# Patient Record
Sex: FEMALE | Race: ASIAN | NOT HISPANIC OR LATINO | Employment: OTHER | URBAN - METROPOLITAN AREA
[De-identification: names, ages, dates, MRNs, and addresses within clinical notes are randomized per-mention and may not be internally consistent; named-entity substitution may affect disease eponyms.]

---

## 2023-02-08 ENCOUNTER — APPOINTMENT (OUTPATIENT)
Dept: RADIOLOGY | Facility: MEDICAL CENTER | Age: 78
DRG: 065 | End: 2023-02-08
Attending: STUDENT IN AN ORGANIZED HEALTH CARE EDUCATION/TRAINING PROGRAM
Payer: MEDICARE

## 2023-02-08 ENCOUNTER — HOSPITAL ENCOUNTER (OUTPATIENT)
Dept: RADIOLOGY | Facility: MEDICAL CENTER | Age: 78
End: 2023-02-08

## 2023-02-08 ENCOUNTER — HOSPITAL ENCOUNTER (INPATIENT)
Facility: MEDICAL CENTER | Age: 78
LOS: 1 days | DRG: 065 | End: 2023-02-09
Attending: STUDENT IN AN ORGANIZED HEALTH CARE EDUCATION/TRAINING PROGRAM | Admitting: STUDENT IN AN ORGANIZED HEALTH CARE EDUCATION/TRAINING PROGRAM
Payer: MEDICARE

## 2023-02-08 DIAGNOSIS — R53.1 LEFT-SIDED WEAKNESS: ICD-10-CM

## 2023-02-08 DIAGNOSIS — I63.9 CEREBROVASCULAR ACCIDENT (CVA), UNSPECIFIED MECHANISM (HCC): ICD-10-CM

## 2023-02-08 LAB
ALBUMIN SERPL BCP-MCNC: 4.6 G/DL (ref 3.2–4.9)
ALBUMIN/GLOB SERPL: 1.5 G/DL
ALP SERPL-CCNC: 98 U/L (ref 30–99)
ALT SERPL-CCNC: 10 U/L (ref 2–50)
ANION GAP SERPL CALC-SCNC: 12 MMOL/L (ref 7–16)
AST SERPL-CCNC: 20 U/L (ref 12–45)
BASOPHILS # BLD AUTO: 0.4 % (ref 0–1.8)
BASOPHILS # BLD: 0.02 K/UL (ref 0–0.12)
BILIRUB SERPL-MCNC: 0.3 MG/DL (ref 0.1–1.5)
BUN SERPL-MCNC: 22 MG/DL (ref 8–22)
CALCIUM ALBUM COR SERPL-MCNC: 8.9 MG/DL (ref 8.5–10.5)
CALCIUM SERPL-MCNC: 9.4 MG/DL (ref 8.5–10.5)
CHLORIDE SERPL-SCNC: 106 MMOL/L (ref 96–112)
CO2 SERPL-SCNC: 22 MMOL/L (ref 20–33)
CREAT SERPL-MCNC: 1.12 MG/DL (ref 0.5–1.4)
EOSINOPHIL # BLD AUTO: 0.03 K/UL (ref 0–0.51)
EOSINOPHIL NFR BLD: 0.5 % (ref 0–6.9)
ERYTHROCYTE [DISTWIDTH] IN BLOOD BY AUTOMATED COUNT: 53.6 FL (ref 35.9–50)
GFR SERPLBLD CREATININE-BSD FMLA CKD-EPI: 51 ML/MIN/1.73 M 2
GLOBULIN SER CALC-MCNC: 3 G/DL (ref 1.9–3.5)
GLUCOSE SERPL-MCNC: 154 MG/DL (ref 65–99)
HCT VFR BLD AUTO: 33.3 % (ref 37–47)
HGB BLD-MCNC: 11.3 G/DL (ref 12–16)
IMM GRANULOCYTES # BLD AUTO: 0.01 K/UL (ref 0–0.11)
IMM GRANULOCYTES NFR BLD AUTO: 0.2 % (ref 0–0.9)
INR PPP: 0.99 (ref 0.87–1.13)
LYMPHOCYTES # BLD AUTO: 2.41 K/UL (ref 1–4.8)
LYMPHOCYTES NFR BLD: 43.9 % (ref 22–41)
MCH RBC QN AUTO: 33.9 PG (ref 27–33)
MCHC RBC AUTO-ENTMCNC: 33.9 G/DL (ref 33.6–35)
MCV RBC AUTO: 100 FL (ref 81.4–97.8)
MONOCYTES # BLD AUTO: 0.4 K/UL (ref 0–0.85)
MONOCYTES NFR BLD AUTO: 7.3 % (ref 0–13.4)
NEUTROPHILS # BLD AUTO: 2.62 K/UL (ref 2–7.15)
NEUTROPHILS NFR BLD: 47.7 % (ref 44–72)
NRBC # BLD AUTO: 0 K/UL
NRBC BLD-RTO: 0 /100 WBC
PLATELET # BLD AUTO: 196 K/UL (ref 164–446)
PMV BLD AUTO: 9.2 FL (ref 9–12.9)
POTASSIUM SERPL-SCNC: 4.1 MMOL/L (ref 3.6–5.5)
PROT SERPL-MCNC: 7.6 G/DL (ref 6–8.2)
PROTHROMBIN TIME: 13 SEC (ref 12–14.6)
RBC # BLD AUTO: 3.33 M/UL (ref 4.2–5.4)
SODIUM SERPL-SCNC: 140 MMOL/L (ref 135–145)
T4 FREE SERPL-MCNC: 0.97 NG/DL (ref 0.93–1.7)
TROPONIN T SERPL-MCNC: 11 NG/L (ref 6–19)
TSH SERPL DL<=0.005 MIU/L-ACNC: 6.28 UIU/ML (ref 0.38–5.33)
WBC # BLD AUTO: 5.5 K/UL (ref 4.8–10.8)

## 2023-02-08 PROCEDURE — 99222 1ST HOSP IP/OBS MODERATE 55: CPT | Mod: AI | Performed by: STUDENT IN AN ORGANIZED HEALTH CARE EDUCATION/TRAINING PROGRAM

## 2023-02-08 PROCEDURE — 84443 ASSAY THYROID STIM HORMONE: CPT

## 2023-02-08 PROCEDURE — 770020 HCHG ROOM/CARE - TELE (206)

## 2023-02-08 PROCEDURE — 36415 COLL VENOUS BLD VENIPUNCTURE: CPT

## 2023-02-08 PROCEDURE — 85025 COMPLETE CBC W/AUTO DIFF WBC: CPT

## 2023-02-08 PROCEDURE — 85610 PROTHROMBIN TIME: CPT

## 2023-02-08 PROCEDURE — 84484 ASSAY OF TROPONIN QUANT: CPT

## 2023-02-08 PROCEDURE — 99285 EMERGENCY DEPT VISIT HI MDM: CPT

## 2023-02-08 PROCEDURE — 80053 COMPREHEN METABOLIC PANEL: CPT

## 2023-02-08 PROCEDURE — 84439 ASSAY OF FREE THYROXINE: CPT

## 2023-02-08 RX ORDER — POLYETHYLENE GLYCOL 3350 17 G/17G
1 POWDER, FOR SOLUTION ORAL
Status: DISCONTINUED | OUTPATIENT
Start: 2023-02-08 | End: 2023-02-09

## 2023-02-08 RX ORDER — METOPROLOL SUCCINATE 50 MG/1
50 TABLET, EXTENDED RELEASE ORAL DAILY
Status: DISCONTINUED | OUTPATIENT
Start: 2023-02-09 | End: 2023-02-09 | Stop reason: HOSPADM

## 2023-02-08 RX ORDER — ASPIRIN 325 MG
325 TABLET ORAL ONCE
Status: DISCONTINUED | OUTPATIENT
Start: 2023-02-09 | End: 2023-02-09

## 2023-02-08 RX ORDER — LISINOPRIL 20 MG/1
20 TABLET ORAL DAILY
Status: DISCONTINUED | OUTPATIENT
Start: 2023-02-09 | End: 2023-02-09 | Stop reason: HOSPADM

## 2023-02-08 RX ORDER — BISACODYL 10 MG
10 SUPPOSITORY, RECTAL RECTAL
Status: DISCONTINUED | OUTPATIENT
Start: 2023-02-08 | End: 2023-02-09

## 2023-02-08 RX ORDER — METHIMAZOLE 5 MG/1
5 TABLET ORAL 2 TIMES DAILY
COMMUNITY

## 2023-02-08 RX ORDER — LABETALOL HYDROCHLORIDE 5 MG/ML
10 INJECTION, SOLUTION INTRAVENOUS EVERY 4 HOURS PRN
Status: DISCONTINUED | OUTPATIENT
Start: 2023-02-08 | End: 2023-02-09 | Stop reason: HOSPADM

## 2023-02-08 RX ORDER — METHIMAZOLE 5 MG/1
5 TABLET ORAL DAILY
Status: DISCONTINUED | OUTPATIENT
Start: 2023-02-09 | End: 2023-02-09 | Stop reason: HOSPADM

## 2023-02-08 RX ORDER — ATORVASTATIN CALCIUM 40 MG/1
40 TABLET, FILM COATED ORAL EVERY EVENING
Status: DISCONTINUED | OUTPATIENT
Start: 2023-02-09 | End: 2023-02-08

## 2023-02-08 RX ORDER — ONDANSETRON 4 MG/1
4 TABLET, ORALLY DISINTEGRATING ORAL EVERY 4 HOURS PRN
Status: DISCONTINUED | OUTPATIENT
Start: 2023-02-08 | End: 2023-02-09 | Stop reason: HOSPADM

## 2023-02-08 RX ORDER — ATORVASTATIN CALCIUM 40 MG/1
40 TABLET, FILM COATED ORAL EVERY EVENING
Status: DISCONTINUED | OUTPATIENT
Start: 2023-02-09 | End: 2023-02-09 | Stop reason: HOSPADM

## 2023-02-08 RX ORDER — LISINOPRIL 20 MG/1
20 TABLET ORAL DAILY
COMMUNITY

## 2023-02-08 RX ORDER — ACETAMINOPHEN 325 MG/1
650 TABLET ORAL EVERY 6 HOURS PRN
Status: DISCONTINUED | OUTPATIENT
Start: 2023-02-08 | End: 2023-02-09 | Stop reason: HOSPADM

## 2023-02-08 RX ORDER — ONDANSETRON 2 MG/ML
4 INJECTION INTRAMUSCULAR; INTRAVENOUS EVERY 4 HOURS PRN
Status: DISCONTINUED | OUTPATIENT
Start: 2023-02-08 | End: 2023-02-09 | Stop reason: HOSPADM

## 2023-02-08 RX ORDER — AMOXICILLIN 250 MG
2 CAPSULE ORAL 2 TIMES DAILY
Status: DISCONTINUED | OUTPATIENT
Start: 2023-02-09 | End: 2023-02-09

## 2023-02-08 RX ORDER — METOPROLOL SUCCINATE 50 MG/1
50 TABLET, EXTENDED RELEASE ORAL DAILY
COMMUNITY

## 2023-02-09 ENCOUNTER — PHARMACY VISIT (OUTPATIENT)
Dept: PHARMACY | Facility: MEDICAL CENTER | Age: 78
End: 2023-02-09
Payer: MEDICARE

## 2023-02-09 ENCOUNTER — APPOINTMENT (OUTPATIENT)
Dept: CARDIOLOGY | Facility: MEDICAL CENTER | Age: 78
DRG: 065 | End: 2023-02-09
Attending: GENERAL PRACTICE
Payer: MEDICARE

## 2023-02-09 ENCOUNTER — NON-PROVIDER VISIT (OUTPATIENT)
Dept: CARDIOLOGY | Facility: MEDICAL CENTER | Age: 78
End: 2023-02-09

## 2023-02-09 VITALS
OXYGEN SATURATION: 96 % | BODY MASS INDEX: 21.52 KG/M2 | SYSTOLIC BLOOD PRESSURE: 152 MMHG | RESPIRATION RATE: 16 BRPM | WEIGHT: 129.19 LBS | HEART RATE: 75 BPM | HEIGHT: 65 IN | DIASTOLIC BLOOD PRESSURE: 84 MMHG | TEMPERATURE: 97.9 F

## 2023-02-09 DIAGNOSIS — I63.9 CEREBROVASCULAR ACCIDENT (CVA), UNSPECIFIED MECHANISM (HCC): ICD-10-CM

## 2023-02-09 DIAGNOSIS — I49.3 PVC'S (PREMATURE VENTRICULAR CONTRACTIONS): ICD-10-CM

## 2023-02-09 DIAGNOSIS — I49.1 APC (ATRIAL PREMATURE CONTRACTIONS): ICD-10-CM

## 2023-02-09 DIAGNOSIS — I47.10 SVT (SUPRAVENTRICULAR TACHYCARDIA) (HCC): ICD-10-CM

## 2023-02-09 PROBLEM — E03.9 HYPOTHYROIDISM: Status: ACTIVE | Noted: 2023-02-09

## 2023-02-09 PROBLEM — I10 HYPERTENSION: Status: ACTIVE | Noted: 2023-02-09

## 2023-02-09 PROBLEM — R73.9 HYPERGLYCEMIA: Status: ACTIVE | Noted: 2023-02-09

## 2023-02-09 LAB
ALBUMIN SERPL BCP-MCNC: 4.6 G/DL (ref 3.2–4.9)
BASOPHILS # BLD AUTO: 0.3 % (ref 0–1.8)
BASOPHILS # BLD: 0.02 K/UL (ref 0–0.12)
BUN SERPL-MCNC: 19 MG/DL (ref 8–22)
CALCIUM ALBUM COR SERPL-MCNC: 8.9 MG/DL (ref 8.5–10.5)
CALCIUM SERPL-MCNC: 9.4 MG/DL (ref 8.5–10.5)
CHLORIDE SERPL-SCNC: 106 MMOL/L (ref 96–112)
CHOLEST SERPL-MCNC: 151 MG/DL (ref 100–199)
CO2 SERPL-SCNC: 21 MMOL/L (ref 20–33)
CREAT SERPL-MCNC: 0.97 MG/DL (ref 0.5–1.4)
EKG IMPRESSION: NORMAL
EOSINOPHIL # BLD AUTO: 0.02 K/UL (ref 0–0.51)
EOSINOPHIL NFR BLD: 0.3 % (ref 0–6.9)
ERYTHROCYTE [DISTWIDTH] IN BLOOD BY AUTOMATED COUNT: 52.9 FL (ref 35.9–50)
EST. AVERAGE GLUCOSE BLD GHB EST-MCNC: 123 MG/DL
GFR SERPLBLD CREATININE-BSD FMLA CKD-EPI: 61 ML/MIN/1.73 M 2
GLUCOSE SERPL-MCNC: 114 MG/DL (ref 65–99)
HBA1C MFR BLD: 5.9 % (ref 4–5.6)
HCT VFR BLD AUTO: 34.4 % (ref 37–47)
HDLC SERPL-MCNC: 40 MG/DL
HGB BLD-MCNC: 11.8 G/DL (ref 12–16)
IMM GRANULOCYTES # BLD AUTO: 0.01 K/UL (ref 0–0.11)
IMM GRANULOCYTES NFR BLD AUTO: 0.2 % (ref 0–0.9)
LDLC SERPL CALC-MCNC: 95 MG/DL
LV EJECT FRACT  99904: 65
LV EJECT FRACT MOD 2C 99903: 70.52
LV EJECT FRACT MOD 4C 99902: 63.3
LV EJECT FRACT MOD BP 99901: 69.76
LYMPHOCYTES # BLD AUTO: 2.84 K/UL (ref 1–4.8)
LYMPHOCYTES NFR BLD: 44.2 % (ref 22–41)
MAGNESIUM SERPL-MCNC: 2.3 MG/DL (ref 1.5–2.5)
MCH RBC QN AUTO: 34.2 PG (ref 27–33)
MCHC RBC AUTO-ENTMCNC: 34.3 G/DL (ref 33.6–35)
MCV RBC AUTO: 99.7 FL (ref 81.4–97.8)
MONOCYTES # BLD AUTO: 0.35 K/UL (ref 0–0.85)
MONOCYTES NFR BLD AUTO: 5.4 % (ref 0–13.4)
NEUTROPHILS # BLD AUTO: 3.19 K/UL (ref 2–7.15)
NEUTROPHILS NFR BLD: 49.6 % (ref 44–72)
NRBC # BLD AUTO: 0 K/UL
NRBC BLD-RTO: 0 /100 WBC
PHOSPHATE SERPL-MCNC: 3.5 MG/DL (ref 2.5–4.5)
PLATELET # BLD AUTO: 182 K/UL (ref 164–446)
PMV BLD AUTO: 9.3 FL (ref 9–12.9)
POTASSIUM SERPL-SCNC: 4.8 MMOL/L (ref 3.6–5.5)
RBC # BLD AUTO: 3.45 M/UL (ref 4.2–5.4)
SODIUM SERPL-SCNC: 139 MMOL/L (ref 135–145)
T4 FREE SERPL-MCNC: 1.01 NG/DL (ref 0.93–1.7)
TRIGL SERPL-MCNC: 80 MG/DL (ref 0–149)
TSH SERPL DL<=0.005 MIU/L-ACNC: 6.22 UIU/ML (ref 0.38–5.33)
WBC # BLD AUTO: 6.4 K/UL (ref 4.8–10.8)

## 2023-02-09 PROCEDURE — 97165 OT EVAL LOW COMPLEX 30 MIN: CPT

## 2023-02-09 PROCEDURE — A9270 NON-COVERED ITEM OR SERVICE: HCPCS | Performed by: GENERAL PRACTICE

## 2023-02-09 PROCEDURE — 85025 COMPLETE CBC W/AUTO DIFF WBC: CPT

## 2023-02-09 PROCEDURE — 700117 HCHG RX CONTRAST REV CODE 255: Performed by: STUDENT IN AN ORGANIZED HEALTH CARE EDUCATION/TRAINING PROGRAM

## 2023-02-09 PROCEDURE — 97535 SELF CARE MNGMENT TRAINING: CPT

## 2023-02-09 PROCEDURE — 700102 HCHG RX REV CODE 250 W/ 637 OVERRIDE(OP): Performed by: GENERAL PRACTICE

## 2023-02-09 PROCEDURE — A9270 NON-COVERED ITEM OR SERVICE: HCPCS

## 2023-02-09 PROCEDURE — 83735 ASSAY OF MAGNESIUM: CPT

## 2023-02-09 PROCEDURE — 92610 EVALUATE SWALLOWING FUNCTION: CPT

## 2023-02-09 PROCEDURE — 93005 ELECTROCARDIOGRAM TRACING: CPT | Performed by: STUDENT IN AN ORGANIZED HEALTH CARE EDUCATION/TRAINING PROGRAM

## 2023-02-09 PROCEDURE — 83036 HEMOGLOBIN GLYCOSYLATED A1C: CPT

## 2023-02-09 PROCEDURE — 80061 LIPID PANEL: CPT

## 2023-02-09 PROCEDURE — 93306 TTE W/DOPPLER COMPLETE: CPT

## 2023-02-09 PROCEDURE — 70498 CT ANGIOGRAPHY NECK: CPT

## 2023-02-09 PROCEDURE — A9270 NON-COVERED ITEM OR SERVICE: HCPCS | Performed by: STUDENT IN AN ORGANIZED HEALTH CARE EDUCATION/TRAINING PROGRAM

## 2023-02-09 PROCEDURE — 84439 ASSAY OF FREE THYROXINE: CPT

## 2023-02-09 PROCEDURE — 99223 1ST HOSP IP/OBS HIGH 75: CPT | Mod: FS | Performed by: NURSE PRACTITIONER

## 2023-02-09 PROCEDURE — 700102 HCHG RX REV CODE 250 W/ 637 OVERRIDE(OP): Performed by: STUDENT IN AN ORGANIZED HEALTH CARE EDUCATION/TRAINING PROGRAM

## 2023-02-09 PROCEDURE — 700102 HCHG RX REV CODE 250 W/ 637 OVERRIDE(OP)

## 2023-02-09 PROCEDURE — 70496 CT ANGIOGRAPHY HEAD: CPT

## 2023-02-09 PROCEDURE — 80069 RENAL FUNCTION PANEL: CPT

## 2023-02-09 PROCEDURE — 97162 PT EVAL MOD COMPLEX 30 MIN: CPT

## 2023-02-09 PROCEDURE — 84443 ASSAY THYROID STIM HORMONE: CPT

## 2023-02-09 PROCEDURE — RXMED WILLOW AMBULATORY MEDICATION CHARGE: Performed by: GENERAL PRACTICE

## 2023-02-09 PROCEDURE — 93010 ELECTROCARDIOGRAM REPORT: CPT | Performed by: INTERNAL MEDICINE

## 2023-02-09 PROCEDURE — 36415 COLL VENOUS BLD VENIPUNCTURE: CPT

## 2023-02-09 PROCEDURE — 99239 HOSP IP/OBS DSCHRG MGMT >30: CPT | Performed by: GENERAL PRACTICE

## 2023-02-09 PROCEDURE — 93306 TTE W/DOPPLER COMPLETE: CPT | Mod: 26 | Performed by: INTERNAL MEDICINE

## 2023-02-09 RX ORDER — ASPIRIN 81 MG/1
81 TABLET ORAL DAILY
Qty: 30 TABLET | Refills: 0 | Status: SHIPPED | OUTPATIENT
Start: 2023-02-10 | End: 2023-03-12

## 2023-02-09 RX ORDER — CLOPIDOGREL BISULFATE 75 MG/1
75 TABLET ORAL DAILY
Qty: 90 TABLET | Refills: 0 | Status: SHIPPED | OUTPATIENT
Start: 2023-02-09 | End: 2023-05-10

## 2023-02-09 RX ORDER — AMOXICILLIN 250 MG
2 CAPSULE ORAL 2 TIMES DAILY PRN
Status: DISCONTINUED | OUTPATIENT
Start: 2023-02-09 | End: 2023-02-09 | Stop reason: HOSPADM

## 2023-02-09 RX ORDER — ATORVASTATIN CALCIUM 40 MG/1
40 TABLET, FILM COATED ORAL EVERY EVENING
Qty: 30 TABLET | Refills: 0 | Status: SHIPPED | OUTPATIENT
Start: 2023-02-09 | End: 2023-03-11

## 2023-02-09 RX ORDER — ASPIRIN 300 MG/1
300 SUPPOSITORY RECTAL ONCE
Status: COMPLETED | OUTPATIENT
Start: 2023-02-09 | End: 2023-02-09

## 2023-02-09 RX ORDER — CLOPIDOGREL BISULFATE 75 MG/1
75 TABLET ORAL DAILY
Status: DISCONTINUED | OUTPATIENT
Start: 2023-02-09 | End: 2023-02-09 | Stop reason: HOSPADM

## 2023-02-09 RX ORDER — POLYETHYLENE GLYCOL 3350 17 G/17G
1 POWDER, FOR SOLUTION ORAL
Status: DISCONTINUED | OUTPATIENT
Start: 2023-02-09 | End: 2023-02-09 | Stop reason: HOSPADM

## 2023-02-09 RX ORDER — BISACODYL 10 MG
10 SUPPOSITORY, RECTAL RECTAL
Status: DISCONTINUED | OUTPATIENT
Start: 2023-02-09 | End: 2023-02-09 | Stop reason: HOSPADM

## 2023-02-09 RX ADMIN — ASPIRIN 300 MG: 300 SUPPOSITORY RECTAL at 03:52

## 2023-02-09 RX ADMIN — LISINOPRIL 20 MG: 20 TABLET ORAL at 14:08

## 2023-02-09 RX ADMIN — METOPROLOL SUCCINATE 50 MG: 50 TABLET, EXTENDED RELEASE ORAL at 14:07

## 2023-02-09 RX ADMIN — CLOPIDOGREL BISULFATE 75 MG: 75 TABLET ORAL at 14:07

## 2023-02-09 RX ADMIN — IOHEXOL 85 ML: 350 INJECTION, SOLUTION INTRAVENOUS at 00:17

## 2023-02-09 RX ADMIN — METHIMAZOLE 5 MG: 5 TABLET ORAL at 14:07

## 2023-02-09 ASSESSMENT — COGNITIVE AND FUNCTIONAL STATUS - GENERAL
DRESSING REGULAR UPPER BODY CLOTHING: A LITTLE
SUGGESTED CMS G CODE MODIFIER MOBILITY: CK
EATING MEALS: A LITTLE
MOVING FROM LYING ON BACK TO SITTING ON SIDE OF FLAT BED: A LITTLE
WALKING IN HOSPITAL ROOM: A LITTLE
CLIMB 3 TO 5 STEPS WITH RAILING: A LITTLE
STANDING UP FROM CHAIR USING ARMS: A LITTLE
TOILETING: A LITTLE
SUGGESTED CMS G CODE MODIFIER MOBILITY: CK
WALKING IN HOSPITAL ROOM: A LITTLE
PERSONAL GROOMING: A LOT
DAILY ACTIVITIY SCORE: 18
TURNING FROM BACK TO SIDE WHILE IN FLAT BAD: A LITTLE
DRESSING REGULAR LOWER BODY CLOTHING: A LITTLE
TOILETING: A LITTLE
TURNING FROM BACK TO SIDE WHILE IN FLAT BAD: A LITTLE
MOBILITY SCORE: 19
STANDING UP FROM CHAIR USING ARMS: A LITTLE
DRESSING REGULAR LOWER BODY CLOTHING: A LITTLE
CLIMB 3 TO 5 STEPS WITH RAILING: A LITTLE
SUGGESTED CMS G CODE MODIFIER DAILY ACTIVITY: CK
HELP NEEDED FOR BATHING: A LITTLE
HELP NEEDED FOR BATHING: A LITTLE
MOBILITY SCORE: 18
MOVING FROM LYING ON BACK TO SITTING ON SIDE OF FLAT BED: A LITTLE
PERSONAL GROOMING: A LITTLE
EATING MEALS: A LITTLE
MOVING TO AND FROM BED TO CHAIR: A LITTLE

## 2023-02-09 ASSESSMENT — LIFESTYLE VARIABLES
EVER FELT BAD OR GUILTY ABOUT YOUR DRINKING: NO
TOTAL SCORE: 0
EVER HAD A DRINK FIRST THING IN THE MORNING TO STEADY YOUR NERVES TO GET RID OF A HANGOVER: NO
CONSUMPTION TOTAL: NEGATIVE
SUBSTANCE_ABUSE: 0
TOTAL SCORE: 0
ON A TYPICAL DAY WHEN YOU DRINK ALCOHOL HOW MANY DRINKS DO YOU HAVE: 0
HAVE YOU EVER FELT YOU SHOULD CUT DOWN ON YOUR DRINKING: NO
HAVE PEOPLE ANNOYED YOU BY CRITICIZING YOUR DRINKING: NO
HOW MANY TIMES IN THE PAST YEAR HAVE YOU HAD 5 OR MORE DRINKS IN A DAY: 0
DOES PATIENT WANT TO STOP DRINKING: NO
ALCOHOL_USE: NO
TOTAL SCORE: 0
AVERAGE NUMBER OF DAYS PER WEEK YOU HAVE A DRINK CONTAINING ALCOHOL: 0

## 2023-02-09 ASSESSMENT — PATIENT HEALTH QUESTIONNAIRE - PHQ9
1. LITTLE INTEREST OR PLEASURE IN DOING THINGS: NOT AT ALL
SUM OF ALL RESPONSES TO PHQ9 QUESTIONS 1 AND 2: 0
1. LITTLE INTEREST OR PLEASURE IN DOING THINGS: NOT AT ALL
2. FEELING DOWN, DEPRESSED, IRRITABLE, OR HOPELESS: NOT AT ALL
2. FEELING DOWN, DEPRESSED, IRRITABLE, OR HOPELESS: NOT AT ALL
SUM OF ALL RESPONSES TO PHQ9 QUESTIONS 1 AND 2: 0

## 2023-02-09 ASSESSMENT — PAIN DESCRIPTION - PAIN TYPE
TYPE: ACUTE PAIN
TYPE: ACUTE PAIN

## 2023-02-09 ASSESSMENT — ENCOUNTER SYMPTOMS
DOUBLE VISION: 0
HEADACHES: 0
BRUISES/BLEEDS EASILY: 0
HEARTBURN: 0
MYALGIAS: 0
FOCAL WEAKNESS: 1
PALPITATIONS: 0
DIZZINESS: 0
BLURRED VISION: 0
COUGH: 0
CHILLS: 0
FEVER: 0
NAUSEA: 0
SHORTNESS OF BREATH: 0
DEPRESSION: 0

## 2023-02-09 ASSESSMENT — GAIT ASSESSMENTS
GAIT LEVEL OF ASSIST: STANDBY ASSIST
ASSISTIVE DEVICE: FRONT WHEEL WALKER
DISTANCE (FEET): 180

## 2023-02-09 ASSESSMENT — ACTIVITIES OF DAILY LIVING (ADL): TOILETING: INDEPENDENT

## 2023-02-09 NOTE — CARE PLAN
The patient is Stable - Low risk of patient condition declining or worsening    Shift Goals  Clinical Goals: swallow eval, stable neuro exam  Patient Goals: sleep  Family Goals: mara    Progress made toward(s) clinical / shift goals:    Problem: Optimal Care of the Stroke Patient  Goal: Optimal emergency care for the stroke patient  2/9/2023 0950 by Nikole Lawton R.N.  Outcome: Progressing  2/9/2023 0950 by Nikole Lawton R.N.  Outcome: Progressing  Goal: Optimal acute care for the stroke patient  2/9/2023 0950 by Nikole Lawton R.N.  Outcome: Progressing  2/9/2023 0950 by Nikole Lawton R.N.  Outcome: Progressing     Problem: Knowledge Deficit - Stroke Education  Goal: Patient's knowledge of stroke and risk factors will improve  2/9/2023 0950 by Nikole Lawton R.N.  Outcome: Progressing  2/9/2023 0950 by Nikole Lawton R.N.  Outcome: Progressing     Problem: Psychosocial - Patient Condition  Goal: Patient's ability to verbalize feelings about condition will improve  2/9/2023 0950 by Nikole Lawton R.N.  Outcome: Progressing  2/9/2023 0950 by Nikole Lawton R.N.  Outcome: Progressing  Goal: Patient's ability to re-evaluate and adapt role responsibilities will improve  2/9/2023 0950 by Nikole Lawton R.N.  Outcome: Progressing  2/9/2023 0950 by Nikole Lawton R.N.  Outcome: Progressing     Problem: Discharge Planning - Stroke  Goal: Ensure Stroke Core Measures are met prior to discharge  2/9/2023 0950 by Nikole Lawton R.N.  Outcome: Progressing  2/9/2023 0950 by Nikole Lawton R.N.  Outcome: Progressing  Goal: Patient’s continuum of care needs will be met  2/9/2023 0950 by Nikole Lawton R.N.  Outcome: Progressing  2/9/2023 0950 by Nikole Lawton R.N.  Outcome: Progressing     Problem: Neuro Status  Goal: Neuro status will remain stable or improve  2/9/2023 0950 by Nikole Lawton R.N.  Outcome: Progressing  2/9/2023 0950 by Nikole Lawton R.N.  Outcome: Progressing     Problem: Hemodynamic Monitoring  Goal:  Patient's hemodynamics, fluid balance and neurologic status will be stable or improve  2/9/2023 0950 by Nikole Lawton R.N.  Outcome: Progressing  2/9/2023 0950 by Nikole Lawton R.N.  Outcome: Progressing     Problem: Respiratory - Stroke Patient  Goal: Patient will achieve/maintain optimum respiratory rate/effort  2/9/2023 0950 by Nikole Lawton R.N.  Outcome: Progressing  2/9/2023 0950 by Nikole Lawton R.N.  Outcome: Progressing     Problem: Dysphagia  Goal: Dysphagia will improve  2/9/2023 0950 by Nikole Lawton R.N.  Outcome: Progressing  2/9/2023 0950 by Nikole Lawton R.N.  Outcome: Progressing     Problem: Risk for Aspiration  Goal: Patient's risk for aspiration will be absent or decrease  2/9/2023 0950 by Nikole Lawton R.N.  Outcome: Progressing  2/9/2023 0950 by Nikole Lawton R.N.  Outcome: Progressing     Problem: Urinary Elimination  Goal: Establish and maintain regular urinary output  2/9/2023 0950 by Nikole Lawton R.N.  Outcome: Progressing  2/9/2023 0950 by Nikole Lawton R.N.  Outcome: Progressing     Problem: Bowel Elimination  Goal: Establish and maintain regular bowel function  2/9/2023 0950 by Nikole Lawton R.N.  Outcome: Progressing  2/9/2023 0950 by Nikole Lawton R.N.  Outcome: Progressing     Problem: Mobility - Stroke  Goal: Patient's capacity to carry out activities will improve  2/9/2023 0950 by Nikole Lawton R.N.  Outcome: Progressing  2/9/2023 0950 by Nikole Lawton R.N.  Outcome: Progressing  Goal: Spasticity will be prevented or improved  2/9/2023 0950 by Nikole Lawton R.N.  Outcome: Progressing  2/9/2023 0950 by Nikole Lawton R.N.  Outcome: Progressing  Goal: Subluxation will be prevented or improved  2/9/2023 0950 by Nikole Lawton R.N.  Outcome: Progressing  2/9/2023 0950 by Nikole Lawton R.N.  Outcome: Progressing     Problem: Self Care  Goal: Patient will have the ability to perform ADLs independently or with assistance (bathe, groom, dress, toilet and feed)  2/9/2023  0950 by Nikole Lawton R.N.  Outcome: Progressing  2/9/2023 0950 by Nikole Lawton R.N.  Outcome: Progressing     Problem: Knowledge Deficit - Standard  Goal: Patient and family/care givers will demonstrate understanding of plan of care, disease process/condition, diagnostic tests and medications  2/9/2023 0950 by Nikole Lawton R.N.  Outcome: Progressing  2/9/2023 0950 by Nikole Lawton R.N.  Outcome: Progressing     Problem: Fall Risk  Goal: Patient will remain free from falls  2/9/2023 0950 by Nikole Lawton R.N.  Outcome: Progressing  2/9/2023 0950 by Nikole Lawton R.N.  Outcome: Progressing       Patient is not progressing towards the following goals:

## 2023-02-09 NOTE — DISCHARGE SUMMARY
Discharge Summary    CHIEF COMPLAINT ON ADMISSION  Chief Complaint   Patient presents with    Unilateral Weakness     Symptoms started 2 weeks ago.  L arm and L leg are less coordinated than they were before.  Pt did see primary care MD.  Ordered blood tests, thyroid levels were low.  MRI occurred today, showed that she had a stroke to her R side.  Brought MRI results.       Reason for Admission  Sent by MD; Stroke      Admission Date  2/8/2023    CODE STATUS  Full Code    HPI & HOSPITAL COURSE  This is a 77-year-old female with past medical history of hypertension, prediabetic with A1c 5.9, and hypothyroidism who presented to the ED on 2/8/2023 who noted to have left-sided weakness for about 2 weeks.    Patient reported to her PCP 2 weeks ago complaining of this left-sided weakness, MRI of the brain was ordered which confirmed a right-sided CVA, official report unavailable.  This MRI imaging was reviewed with our on-call radiologist which noted probable subacute right M2 infarct.    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  CTA head noted right M2 occlusion with distal reconstitution.  Neurology was consulted, with recommendation to initiate aspirin, plavix and without pursuing intervention for the above findings.      Patient was evaluated by PT/OT with recommendations for home health and a walker, this was ordered and provided. Meds to beds were sent for all her medications.  She is to follow-up with her primary care physician within 1 to 2 weeks.  Daily blood pressure monitoring and log.    Therefore, she is discharged in good and stable condition to home with organized home healthcare and close outpatient follow-up.    The patient met 2-midnight criteria for an inpatient stay at the time of discharge.    Discharge Date  02/09/2023    FOLLOW UP ITEMS POST DISCHARGE  Primary care physician  Neurology    DISCHARGE DIAGNOSES  Principal Problem:    CVA (cerebral vascular accident)  (HCC) POA: Unknown  Active Problems:    Hypertension POA: Unknown    Hypothyroidism POA: Unknown    Hyperglycemia POA: Unknown  Resolved Problems:    * No resolved hospital problems. *      FOLLOW UP  Future Appointments   Date Time Provider Department Center   3/27/2023  2:00 PM Lela Wong M.D. [facility] None     [facility]  [address] [facility] [address]  [place] [address]  [phone]  Follow up        MEDICATIONS ON DISCHARGE     Medication List        START taking these medications        Instructions   aspirin 81 MG EC tablet  Start taking on: February 10, 2023   Take 1 Tablet by mouth every day for 30 days.  Dose: 81 mg     atorvastatin 40 MG Tabs  Commonly known as: LIPITOR   Take 1 Tablet by mouth every evening for 30 days.  Dose: 40 mg     clopidogrel 75 MG Tabs  Commonly known as: PLAVIX   Take 1 Tablet by mouth every day for 90 days.  Dose: 75 mg            CONTINUE taking these medications        Instructions   lisinopril 20 MG Tabs  Commonly known as: PRINIVIL   Take 20 mg by mouth every day.  Dose: 20 mg     methimazole 5 MG Tabs  Commonly known as: TAPAZOLE   Take 5 mg by mouth 2 times a day.  Dose: 5 mg     metoprolol SR 50 MG Tb24  Commonly known as: TOPROL XL   Take 50 mg by mouth every day.  Dose: 50 mg              Allergies  No Known Allergies    DIET  Orders Placed This Encounter   Procedures    Diet Order Diet: Level 6 - Soft and Bite Sized (Assist with tray set up, provide feeding assist as needed); Liquid level: Level 0 - Thin; Tray Modifications (optional): SLP - Deliver to Nursing Station     Standing Status:   Standing     Number of Occurrences:   1     Order Specific Question:   Diet:     Answer:   Level 6 - Soft and Bite Sized [23]     Comments:   Assist with tray set up, provide feeding assist as needed     Order Specific Question:   Liquid level     Answer:   Level 0 - Thin     Order Specific Question:   Tray Modifications (optional)     Answer:   SLP - Deliver to Nursing  Station       ACTIVITY  As tolerated.  Weight bearing as tolerated    CONSULTATIONS  Neurology    PROCEDURES  None    LABORATORY  Lab Results   Component Value Date    SODIUM 139 02/09/2023    POTASSIUM 4.8 02/09/2023    CHLORIDE 106 02/09/2023    CO2 21 02/09/2023    GLUCOSE 114 (H) 02/09/2023    BUN 19 02/09/2023    CREATININE 0.97 02/09/2023        Lab Results   Component Value Date    WBC 6.4 02/09/2023    HEMOGLOBIN 11.8 (L) 02/09/2023    HEMATOCRIT 34.4 (L) 02/09/2023    PLATELETCT 182 02/09/2023      EC-ECHOCARDIOGRAM COMPLETE W/O CONT   Final Result      CT-CTA HEAD WITH & W/O-POST PROCESS   Final Result         1.  Focal right M2 occlusion with distal reconstitution.      These findings were discussed with the patient's clinician, Thomas Moreira, on 2/9/2023 12:46 AM.      Note: Interpretation delayed due to technical PACS issues.      CT-CTA NECK WITH & W/O-POST PROCESSING   Final Result         1.  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      These findings were discussed with the patient's clinician, Ladi Dsouza, on 2/9/2023 12:25 AM.      Note: Interpretation somewhat delayed due to technical PACS issues.      OUTSIDE IMAGES-MR BRAIN   Final Result         Total time of the discharge process exceeds 45 minutes.

## 2023-02-09 NOTE — THERAPY
Occupational Therapy   Initial Evaluation     Patient Name: Haley Wong  Age:  77 y.o., Sex:  female  Medical Record #: [id number]  Today's Date: 2/9/2023     Precautions  Precautions: (P) Fall Risk, Swallow Precautions ( See Comments)    Assessment    Patient is 77 y.o. female admitted with left sided weakness ~2 weeks, suspected CVA (subacute R MCA CVA), HTN. Pt mandarin speaking only and daughter electing to translate for session, pt normally independent with ADLs and functional mobility living in a [facility] with daughter. Pt with equal strength in BUEs, slight discoordination noted in LUE but otherwise pt is close to baseline. Extensive discussion had with patients daughter about discharge recommendations (Home health vs outpatient), in agreement for recommendation of home health therapy at discharge.       Plan    DC Equipment Recommendations: (P) None  Discharge Recommendations: (P) Recommend home health for continued occupational therapy services     Objective       02/09/23 1240   Prior Living Situation   Prior Services Home-Independent   Housing / Facility 1 Story House   Bathroom Set up Walk In Shower   Equipment Owned None   Lives with - Patient's Self Care Capacity Adult Children   Prior Level of ADL Function   Self Feeding Independent   Grooming / Hygiene Independent   Bathing Independent   Dressing Independent   Toileting Independent   Prior Level of IADL Function   Prior Level Of Mobility Independent Without Device in Home   Driving / Transportation Relatives / Others Provide Transportation   Occupation (Pre-Hospital Vocational) Not Employed   Precautions   Precautions Fall Risk;Swallow Precautions ( See Comments)   Cognition    Cognition / Consciousness WDL   Level of Consciousness Alert   Active ROM Upper Body   Active ROM Upper Body  WDL   Dominant Hand Right   Strength Upper Body   Upper Body Strength  WDL   Sensation Upper Body   Upper Extremity Sensation  WDL   Upper Body Muscle Tone   Upper Body Muscle  Tone  WDL   Coordination Upper Body   Coordination X   Fine Motor Coordination LUE impaired   Gross Motor Coordination LUE impaired   Balance Assessment   Sitting Balance (Static) Fair   Sitting Balance (Dynamic) Fair   Standing Balance (Static) Fair   Standing Balance (Dynamic) Fair   Weight Shift Sitting Fair   Weight Shift Standing Fair   Comments w/ FWW   Bed Mobility    Supine to Sit Supervised   Sit to Supine Supervised   Scooting Supervised   Rolling Supervised   ADL Assessment   Grooming Supervision   Upper Body Dressing Supervision   Lower Body Dressing Supervision   How much help from another person does the patient currently need...   Putting on and taking off regular lower body clothing? 3   Bathing (including washing, rinsing, and drying)? 3   Toileting, which includes using a toilet, bedpan, or urinal? 3   Putting on and taking off regular upper body clothing? 3   Taking care of personal grooming such as brushing teeth? 3   Eating meals? 3   6 Clicks Daily Activity Score 18   mRS Prior to admission   Prior to admission mRS 0   Modified Dougherty (mRS)   Modified Dougherty Score 3   Functional Mobility   Sit to Stand Supervised   Bed, Chair, Wheelchair Transfer Supervised   Transfer Method Stand Step   Mobility bed mobility, hallway mobility, back to bed   Comments w/ FWW   Visual Perception   Visual Perception  X   Neglect Mild Left   Activity Tolerance   Sitting Edge of Bed 5 min   Standing 10 min   Education Group   Education Provided Role of Occupational Therapist   Role of Occupational Therapist Patient Response Patient;Family;Acceptance;Explanation   Problem List   Problem List None   Anticipated Discharge Equipment and Recommendations   DC Equipment Recommendations None   Discharge Recommendations Recommend home health for continued occupational therapy services   Interdisciplinary Plan of Care Collaboration   IDT Collaboration with  Nursing;Physical Therapist;Family / Caregiver   Patient Position at  End of Therapy In Bed;Bed Alarm On;Call Light within Reach;Tray Table within Reach;Phone within Reach;Family / Friend in Room   Collaboration Comments RN updated

## 2023-02-09 NOTE — HOSPITAL COURSE
This is a 77-year-old female with past medical history of hypertension, prediabetic with A1c 5.9, and hypothyroidism who presented to the ED on 2/8/2023 who noted to have left-sided weakness for about 2 weeks.    Patient reported to her PCP 2 weeks ago complaining of this left-sided weakness, MRI of the brain was ordered which confirmed a right-sided CVA, official report unavailable.  This MRI imaging was reviewed with our on-call radiologist which noted probable subacute right M2 infarct.    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  CTA head noted right M2 occlusion with distal reconstitution.  Neurology was consulted, with recommendation to initiate aspirin, plavix and without pursuing intervention for the above findings.      Patient was evaluated by PT/OT with recommendations for home health and a walker, this was ordered and provided. Meds to beds were sent for all her medications.  She is to follow-up with her primary care physician within 1 to 2 weeks.  Daily blood pressure monitoring and log.

## 2023-02-09 NOTE — ED TRIAGE NOTES
"Chief Complaint   Patient presents with    Unilateral Weakness     Symptoms started 2 weeks ago.  L arm and L leg are less coordinated than they were before.  Pt did see primary care MD.  Ordered blood tests, thyroid levels were low.  MRI occurred today, showed that she had a stroke to her R side.  Brought MRI results.       Pt wheeled to triage with daughter. Pt A&Ox4, came in for above complaint.     Pt to yellow 57 . Pt educated on alerting staff in changes to condition. Pt verbalized understanding.     BP (!) 162/88   Pulse 80   Temp 37.2 °C (99 °F) (Temporal)   Resp 16   Ht 1.651 m (5' 5\")   Wt 58.6 kg (129 lb 3 oz)   SpO2 97%   BMI 21.50 kg/m²     "

## 2023-02-09 NOTE — ED PROVIDER NOTES
ED Provider Note    CHIEF COMPLAINT  Chief Complaint   Patient presents with    Unilateral Weakness     Symptoms started 2 weeks ago.  L arm and L leg are less coordinated than they were before.  Pt did see primary care MD.  Ordered blood tests, thyroid levels were low.  MRI occurred today, showed that she had a stroke to her R side.  Brought MRI results.         HPI/ROS  LIMITATION TO HISTORY   Mandarin speaking. Daughter prefers to provide history   OUTSIDE HISTORIAN(S):  Family Daughter provides history    Haley Wong is a 77 y.o. female who presents with left arm and left leg weakness.  History obtained from daughter.  She states that 2 weeks ago she started to have weakness of her left arm and leg and difficulty with coordination.  She is typically very independent and is able to cook, clean and dress herself.  She had been complaining of difficulty with holding her coffee cup and was starting to favor her right side with walking.  She does not have any speech changes, facial droop or complaints of numbness.  She did see her primary doctor who was initially concerned that it might be related to thyroid dyscrasia.  She had this tested and thyroid levels were high and her occasions were adjusted accordingly.  She was then ordered for an outpatient MRI.  She was at the MRI earlier today and they noted that she had had a stroke on the right side of her brain.  She was therefore directed to present to the emergency department.  She is not on any anticoagulation, she does not take daily aspirin.  She has a history of hypertension but does not have high cholesterol or diabetes.    PAST MEDICAL HISTORY   has a past medical history of Hypertension.    SURGICAL HISTORY  patient denies any surgical history    FAMILY HISTORY  History reviewed. No pertinent family history.    SOCIAL HISTORY  Social History     Tobacco Use    Smoking status: Never    Smokeless tobacco: Never   Vaping Use    Vaping Use: Never used   Substance  "and Sexual Activity    Alcohol use: Never    Drug use: Never    Sexual activity: Not on file       CURRENT MEDICATIONS  Home Medications       Reviewed by Robson Merino R.N. (Registered Nurse) on 02/09/23 at 0053  Med List Status: Complete     Medication Last Dose Status   lisinopril (PRINIVIL) 20 MG Tab 2/8/2023 Active   methimazole (TAPAZOLE) 5 MG Tab 2/8/2023 Active   metoprolol SR (TOPROL XL) 50 MG TABLET SR 24 HR 2/8/2023 Active                    ALLERGIES  No Known Allergies    PHYSICAL EXAM  VITAL SIGNS: /79   Pulse 63   Temp 36.4 °C (97.5 °F) (Temporal)   Resp 17   Ht 1.651 m (5' 5\")   Wt 58.6 kg (129 lb 3 oz)   SpO2 92%   BMI 21.50 kg/m²    Constitutional: Awake and alert . No distress  HENT: Normal inspection  . Moist mucous membranes  Eyes: Normal inspection  Neck: Grossly normal range of motion.  Cardiovascular: Normal heart rate, Normal rhythm.  Symmetric peripheral pulses.   Thorax & Lungs: No respiratory distress, No wheezing, No rales, No rhonchi, No chest tenderness.   Abdomen: Soft, non-distended, nontender, no mass  Skin: No obvious rash.  Extremities: Warm, well perfused. No clubbing, cyanosis, edema   Neurologic:   CN II-XII tested and intact. No facial droop.  No dysarthria  Sensation intact to light touch in all extremities.  Motor: Normal tone and bulk. No abnormal movements appreciated. No pronator drift.  She has full strength to the right upper and lower extremity.  She has very mild left-sided weakness , compared to the right as tested by left arm raise, elbow/flexion extension, flexion, ankle dorsiflexion and plantarflexion.  Coordination: Finger to nose  testing intact bilaterally  Psychiatric: Normal for situation      DIAGNOSTIC STUDIES / PROCEDURES  EKG  I have independently interpreted this EKG  Normal rate, normal rhythm, normal axis.  No significant ST wave deviations.  Intervals within normal limits.  My interpretation is normal EKG with nonspecific T wave " abnormalities.    LABS  Results for orders placed or performed during the hospital encounter of 02/08/23   CBC WITH DIFFERENTIAL   Result Value Ref Range    WBC 5.5 4.8 - 10.8 K/uL    RBC 3.33 (L) 4.20 - 5.40 M/uL    Hemoglobin 11.3 (L) 12.0 - 16.0 g/dL    Hematocrit 33.3 (L) 37.0 - 47.0 %    .0 (H) 81.4 - 97.8 fL    MCH 33.9 (H) 27.0 - 33.0 pg    MCHC 33.9 33.6 - 35.0 g/dL    RDW 53.6 (H) 35.9 - 50.0 fL    Platelet Count 196 164 - 446 K/uL    MPV 9.2 9.0 - 12.9 fL    Neutrophils-Polys 47.70 44.00 - 72.00 %    Lymphocytes 43.90 (H) 22.00 - 41.00 %    Monocytes 7.30 0.00 - 13.40 %    Eosinophils 0.50 0.00 - 6.90 %    Basophils 0.40 0.00 - 1.80 %    Immature Granulocytes 0.20 0.00 - 0.90 %    Nucleated RBC 0.00 /100 WBC    Neutrophils (Absolute) 2.62 2.00 - 7.15 K/uL    Lymphs (Absolute) 2.41 1.00 - 4.80 K/uL    Monos (Absolute) 0.40 0.00 - 0.85 K/uL    Eos (Absolute) 0.03 0.00 - 0.51 K/uL    Baso (Absolute) 0.02 0.00 - 0.12 K/uL    Immature Granulocytes (abs) 0.01 0.00 - 0.11 K/uL    NRBC (Absolute) 0.00 K/uL   COMP METABOLIC PANEL   Result Value Ref Range    Sodium 140 135 - 145 mmol/L    Potassium 4.1 3.6 - 5.5 mmol/L    Chloride 106 96 - 112 mmol/L    Co2 22 20 - 33 mmol/L    Anion Gap 12.0 7.0 - 16.0    Glucose 154 (H) 65 - 99 mg/dL    Bun 22 8 - 22 mg/dL    Creatinine 1.12 0.50 - 1.40 mg/dL    Calcium 9.4 8.5 - 10.5 mg/dL    AST(SGOT) 20 12 - 45 U/L    ALT(SGPT) 10 2 - 50 U/L    Alkaline Phosphatase 98 30 - 99 U/L    Total Bilirubin 0.3 0.1 - 1.5 mg/dL    Albumin 4.6 3.2 - 4.9 g/dL    Total Protein 7.6 6.0 - 8.2 g/dL    Globulin 3.0 1.9 - 3.5 g/dL    A-G Ratio 1.5 g/dL   TROPONIN   Result Value Ref Range    Troponin T 11 6 - 19 ng/L   PT/INR   Result Value Ref Range    PT 13.0 12.0 - 14.6 sec    INR 0.99 0.87 - 1.13   TSH WITH REFLEX TO FT4   Result Value Ref Range    TSH 6.280 (H) 0.380 - 5.330 uIU/mL   CORRECTED CALCIUM   Result Value Ref Range    Correct Calcium 8.9 8.5 - 10.5 mg/dL   ESTIMATED GFR    Result Value Ref Range    GFR (CKD-EPI) 51 (A) >60 mL/min/1.73 m 2   FREE THYROXINE   Result Value Ref Range    Free T-4 0.97 0.93 - 1.70 ng/dL   CBC WITH DIFFERENTIAL   Result Value Ref Range    WBC 6.4 4.8 - 10.8 K/uL    RBC 3.45 (L) 4.20 - 5.40 M/uL    Hemoglobin 11.8 (L) 12.0 - 16.0 g/dL    Hematocrit 34.4 (L) 37.0 - 47.0 %    MCV 99.7 (H) 81.4 - 97.8 fL    MCH 34.2 (H) 27.0 - 33.0 pg    MCHC 34.3 33.6 - 35.0 g/dL    RDW 52.9 (H) 35.9 - 50.0 fL    Platelet Count 182 164 - 446 K/uL    MPV 9.3 9.0 - 12.9 fL    Neutrophils-Polys 49.60 44.00 - 72.00 %    Lymphocytes 44.20 (H) 22.00 - 41.00 %    Monocytes 5.40 0.00 - 13.40 %    Eosinophils 0.30 0.00 - 6.90 %    Basophils 0.30 0.00 - 1.80 %    Immature Granulocytes 0.20 0.00 - 0.90 %    Nucleated RBC 0.00 /100 WBC    Neutrophils (Absolute) 3.19 2.00 - 7.15 K/uL    Lymphs (Absolute) 2.84 1.00 - 4.80 K/uL    Monos (Absolute) 0.35 0.00 - 0.85 K/uL    Eos (Absolute) 0.02 0.00 - 0.51 K/uL    Baso (Absolute) 0.02 0.00 - 0.12 K/uL    Immature Granulocytes (abs) 0.01 0.00 - 0.11 K/uL    NRBC (Absolute) 0.00 K/uL   Renal Function Panel   Result Value Ref Range    Sodium 139 135 - 145 mmol/L    Potassium 4.8 3.6 - 5.5 mmol/L    Chloride 106 96 - 112 mmol/L    Co2 21 20 - 33 mmol/L    Glucose 114 (H) 65 - 99 mg/dL    Creatinine 0.97 0.50 - 1.40 mg/dL    Bun 19 8 - 22 mg/dL    Calcium 9.4 8.5 - 10.5 mg/dL    Phosphorus 3.5 2.5 - 4.5 mg/dL    Albumin 4.6 3.2 - 4.9 g/dL   MAGNESIUM   Result Value Ref Range    Magnesium 2.3 1.5 - 2.5 mg/dL   Lipid Profile   Result Value Ref Range    Cholesterol,Tot 151 100 - 199 mg/dL    Triglycerides 80 0 - 149 mg/dL    HDL 40 >=40 mg/dL    LDL 95 <100 mg/dL   CORRECTED CALCIUM   Result Value Ref Range    Correct Calcium 8.9 8.5 - 10.5 mg/dL   ESTIMATED GFR   Result Value Ref Range    GFR (CKD-EPI) 61 >60 mL/min/1.73 m 2   EKG   Result Value Ref Range    Report       [facility]    Test Date:  2023-02-09  Pt Name:    VAMSI BRUCE                   Department: ER  MRN:        [id number]                      Room:       [facility]  Gender:     Female                       Technician: CARTER  :        [id number]                   Requested By:HAMZAH MORENO  Order #:    [id number]                    Reading MD:    Measurements  Intervals                                Axis  Rate:       66                           P:          54  VT:         230                          QRS:        -2  QRSD:       81                           T:          36  QT:         397  QTc:        416    Interpretive Statements  Sinus rhythm  Prolonged VT interval  Nonspecific T abnormalities, lateral leads  Borderline ST elevation, lateral leads  Artifact in lead(s) I,II,III,aVR,aVL,aVF,V1,V2,V3,V4,V5,V6  No previous ECG available for comparison           RADIOLOGY  I have independently interpreted the diagnostic imaging associated with this visit and am waiting the final reading from the radiologist.   My preliminary interpretation is a follows: MRI with right sided infarct      COURSE & MEDICAL DECISION MAKING    ED Observation Status? No; Patient does not meet criteria for ED Observation.     INITIAL ASSESSMENT, COURSE AND PLAN  Care Narrative: This is a 77-year-old  with HTN, who is speaking who presents from an outpatient MRI which was concerning for right-sided infarct.  Her symptom onset was 2 weeks ago and she is well outside of the window for tPA or thrombectomy and therefore this was not a code stroke activation.  She actually only has a very slight left-sided weakness compared to the right side and no other focal neurological deficits on exam.  I did discuss the MRI finding with the radiologist who agrees with finding of subacute right-sided infarct.  I ordered a CT and CTA for stroke work up which is notable for right vertebral artery occlusion. No bleed.  Her lab work is overall reassuring she has no significant electrolyte derangements troponin is normal, thyroid testing is  normal.  She is not anticoagulated.  I discussed with Dr. Moreira for admission for expedited stroke work-up.  He requested stroke neurology consultation prior to admission.  Dr. Turner (Neurology) agreed with admission to the hospital. He agreed without pursuing intervention and recommended initiation of aspirin.  Neurology will consult as an inpatient.  She was admitted to the hospital in good condition.          DISPOSITION AND DISCUSSIONS  I have discussed management of the patient with the following physicians and BLAISE's:  Dr. Turner. Dr Moreira    Discussion of management with other [facility] [place] or appropriate source(s): Radiologist regarding MRI      Decision tools and prescription drugs considered including, but not limited to: NIH Stroke Scale 0 .    FINAL DIAGNOSIS  1. Cerebrovascular accident (CVA), unspecified mechanism (HCC)    2. Left-sided weakness           Electronically signed by: Ladi Dsouza M.D., 2/8/2023 9:00 PM

## 2023-02-09 NOTE — THERAPY
"Speech Language Pathology   Clinical Swallow Evaluation     Patient Name: Haley Wong  AGE:  77 y.o., SEX:  female  Medical Record #: [id number]  Today's Date: 2023     HPI: 77 y.o. woman admitted on 23 for unilateral weakness, found to have R M2 occlusion.     CMH: stroke, HTN, hyperglycemia, hypothyroidism.     PMH: none on file. Not previously seen by service per EMR.     Imagin23 CTA Head: Focal right M2 occlusion with distal reconstitution.        Level of Consciousness: Alert  Affect/Behavior: Appropriate, Calm, Cooperative  Follows Directives: Yes  Orientation: Self, General place, Current month, Current year  Hearing: Functional hearing  Vision: Functional vision      Prior Living Situation & Level of Function:  Patient lives with her adult daughter. She was previously independent in IADLs and independent while daughter was working. Her daughter reported previous two weeks patient has required assistance during the day and unable to be left alone. Ambulatory at baseline.       Oral Mechanism Evaluation  Facial Symmetry: Equal  Facial Sensation: Impaired  Labial Observations: WFL  Lingual Observations: Midline  Dentition: Poor, Natural dentition, Missing posterior dentition  Comments:      Voice  Quality: WFL, presbyphonic  Resonance: WFL  Intensity: Appropriate  Cough: WFL  Comments:      Current Method of Nutrition   NPO until cleared by speech pathology      Subjective  Patient reports consuming softer foods at baseline - oatmeal and eggs for breakfast, noodles for lunch. Patient's daughter reported increased need for softer foods two weeks prior, as patient reported food being \"hard to swallow.\" Further questioning revealed food was not getting \"stuck,\" but rather moving \"slowly.\"        Assessment  Positioning: Jimenez's (60-90 degrees)  Bolus Administration: Patient  Oxygen Requirements: Room Air  Factor(s) Affecting Performance: None    Swallowing Trials  Thin Liquid (TN0): WFL  Liquidised " "(LQ3): WFL  Regular (RG7): Impaired    Comments: Adequate oral bolus acceptance, incomplete AP transfer with prolonged mastication of regular solids. Some prolonged AP transfer with liquids, which daughter reports has been consistent over past two weeks. Suspect impaired facial sensation, evidenced by cracker piece on L labial with no effort to clear. No cough/throat clear appreciated with PO. Vocal quality remained stable throughout assessment. Single swallow completed per bolus. Provided education regarding general aspiration precautions as well as signs of aspiration. All questions addressed.           Clinical Impressions  Patient presents with clinically functional oropharyngeal swallow for diet of soft solids. Oral phase deficits including prolonged mastication with regular solids, consistent with baseline related to dental status. Service will continue to follow to maximize swallowing outcomes.        Recommendations  1.  Soft and bite size solids with thin liquids  2.  Instrumentation: None indicated at this time  3.  Swallowing Instructions & Precautions:   Supervision: Assist with meal tray set up, Distant supervision - check on patient 2-3 times per meal  Positioning: Fully upright and midline during oral intake  Medication: Whole with liquid  Strategies: Small bites/sips, Slow rate of intake  Oral Care: BID  4.  Following for dysphagia management and completion of cognitive-linguistic evaluation      Plan    Recommend Speech Therapy 3 times per week until therapy goals are met for the following treatments:  Dysphagia Training and Patient / Family / Caregiver Education.     Objective       02/09/23 0934   Patient / Family Goals   Patient / Family Goal #1 \"Two weeks ago she complained about it being hard to swallow\" per daughter   Short Term Goals   Short Term Goal # 1 Patient will consume a diet of SB6/TN0 without overt indicators of aspiration or decline in pulmonary status.       "

## 2023-02-09 NOTE — ED NOTES
Med Rec complete per patient with family @ bedside  No oral antibiotics in the last 30 days   Allergies reviewed  Preferred Pharmacy: [facility]

## 2023-02-09 NOTE — DISCHARGE INSTRUCTIONS
Please ensure that you follow-up with your primary care physician in 1 to 2 weeks, please continue monitoring your blood pressure and keep a blood pressure log to present to see your primary care physician.    Please ensure that you follow-up with the neurology stroke clinic to follow-up on the Zio patch which is the cardiac monitor that you are wearing and also for routine post stroke follow-up.    Continue with home health/physical therapy and Occupational Therapy, please also continue with outpatient therapies as well.    Please take care and be well!

## 2023-02-09 NOTE — ASSESSMENT & PLAN NOTE
Probable subacute right MCA CVA  Left-sided weakness for at least 2 weeks per daughter, improving    Outpatient MRI brain 2/8/2023: Right M2 CVA, no acute -- probable subacute --official report not included, personally discussed with on-call radiology    CTA neck: Left vertebral artery occlusion  CT head: Pending    Discussed with neurology, no acute intervention needed given out of therapeutic window  Aspirin/statin  PT/OT/ST  Follow-up echo  Neurology follow-up appreciated

## 2023-02-09 NOTE — DISCHARGE PLANNING
[facility] Transitional Care Coordination    Referral from:  Harish  Insurance Provider on Facesheet:Daughter  Potential Rehab Diagnosis: CVA    Chart review indicates patient may have on going medical management and may have therapy needs to possibly meet inpatient rehab facility criteria with the goal of returning to community.    D/C support: Daughter, needs to be verified     Physiatry consultation pending per protocol  Will need updated PT/OT notes when medically appropriate         Thank you for the referral.

## 2023-02-09 NOTE — PROGRESS NOTES
4 Eyes Skin Assessment Completed by JOSUÉ Chance and JOSUÉ Gilliam.    Head WDL  Ears WDL  Nose WDL  Mouth WDL  Neck WDL  Breast/Chest WDL  Shoulder Blades WDL  Spine WDL  (R) Arm/Elbow/Hand WDL  (L) Arm/Elbow/Hand WDL  Abdomen WDL  Groin WDL  Scrotum/Coccyx/Buttocks r hip/glut bruising  (R) Leg R thigh bruising  (L) Leg WDL  (R) Heel/Foot/Toe WDL  (L) Heel/Foot/Toe WDL          Devices In Places Tele Box      Interventions In Place Heels Loaded W/Pillows and Pressure Redistribution Mattress    Possible Skin Injury No    Pictures Uploaded Into Epic N/A  Wound Consult Placed N/A  RN Wound Prevention Protocol Ordered No

## 2023-02-09 NOTE — CONSULTS
Chief Complaint   Patient presents with    Unilateral Weakness     Symptoms started 2 weeks ago.  L arm and L leg are less coordinated than they were before.  Pt did see primary care MD.  Ordered blood tests, thyroid levels were low.  MRI occurred today, showed that she had a stroke to her R side.  Brought MRI results.         Neurology Initial Consult H&P  Neurohospitalist Service, [facility]    History of present illness:  Haley Wong is a 77 y.o.  female with a PMHx of hypertension, and hypothyroidism who presented on 2/8/2023 with a 2-week history of left upper and left lower extremity discoordination and weakness.  The patient was seen by her primary care provider who initially completed lab work which was significant for hypothyroidism but symptoms persisted therefore prompting the PCP to order an MRI brain.  MRI brain demonstrated right temporal parietal, and occipital subacute infarcts which prompted the primary care provider to instruct the patient to present to the ED for further evaluation.  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  With last known well being over 2 weeks ago the patient is not a candidate for acute stroke therapies.  Neurology has been consulted for further evaluation of the above    Referring Provider: Nesha Garcia D.O. has consulted Neurology for further evaluation    Past medical history:   Past Medical History:   Diagnosis Date    Hypertension        Past surgical history:   History reviewed. No pertinent surgical history.    Family history:   History reviewed. No pertinent family history.    Social history:   Social History     Socioeconomic History    Marital status: Single     Spouse name: Not on file    Number of children: Not on file    Years of education: Not on file    Highest education level: Not on file   Occupational History    Not on file   Tobacco Use    Smoking status:  Never    Smokeless tobacco: Never   Vaping Use    Vaping Use: Never used   Substance and Sexual Activity    Alcohol use: Never    Drug use: Never    Sexual activity: Not on file   Other Topics Concern    Not on file   Social History Narrative    Not on file     Social Determinants of Health     Financial Resource Strain: Not on file   Food Insecurity: Not on file   Transportation Needs: Not on file   Physical Activity: Not on file   Stress: Not on file   Social Connections: Not on file   Intimate Partner Violence: Not on file   Housing Stability: Not on file       Current medications:   Current Facility-Administered Medications   Medication Dose    senna-docusate (PERICOLACE or SENOKOT S) 8.6-50 MG per tablet 2 Tablet  2 Tablet    And    polyethylene glycol/lytes (MIRALAX) PACKET 1 Packet  1 Packet    And    magnesium hydroxide (MILK OF MAGNESIA) suspension 30 mL  30 mL    And    bisacodyl (DULCOLAX) suppository 10 mg  10 mg    acetaminophen (Tylenol) tablet 650 mg  650 mg    labetalol (NORMODYNE/TRANDATE) injection 10 mg  10 mg    ondansetron (ZOFRAN) syringe/vial injection 4 mg  4 mg    ondansetron (ZOFRAN ODT) dispertab 4 mg  4 mg    lisinopril (PRINIVIL) tablet 20 mg  20 mg    [Held by provider] methimazole (TAPAZOLE) tablet 5 mg  5 mg    metoprolol SR (TOPROL XL) tablet 50 mg  50 mg    aspirin EC (ECOTRIN) tablet 81 mg  81 mg    atorvastatin (LIPITOR) tablet 40 mg  40 mg       Medication Allergy:  No Known Allergies    Review of systems:   Constitutional: denies fever, night sweats, weight loss.   Eyes: denies acute vision change, eye pain or secretion.   Ears, Nose, Mouth, Throat: denies nasal secretion, nasal bleeding, difficulty swallowing, hearing loss, tinnitus, vertigo, ear pain, acute dental problems, oral ulcers or lesions.   Endocrine: denies recent weight changes, heat or cold intolerance, polyuria, polydypsia, polyphagia,abnormal hair growth.  Cardiovascular: denies new onset of chest pain,  palpitations, syncope, or dyspnea of exertion.  Pulmonary: denies shortness of breath, new onset of cough, hemoptysis, wheezing, chest pain or flu-like symptoms.   GI: denies nausea, vomiting, diarrhea, GI bleeding, change in appetite, abdominal pain, and change in bowel habits.  : denies dysuria, urinary incontinence, hematuria.  Heme/oncology: denies history of easy bruising or bleeding. No history of cancer, DVTor PE.  Allergy/immunology: denies hives/urticaria, or itching.   Dermatologic: denies new rash, or new skin lesions.  Musculoskeletal:denies joint swelling or pain, muscle pain, neck and back pain.   Neurologic: denies headaches, acute visual changes, facial droopiness, muscle weakness (focal or generalized), paresthesias, anesthesia, ataxia, change in speech or language, memory loss, abnormal movements, seizures, loss of consciousness, or episodes of confusion.   Psychiatric: denies symptoms of depression, anxiety, hallucinations, mood swings or changes, suicidal or homicidal thoughts.     Physical examination:   Vitals:    02/09/23 0109 02/09/23 0200 02/09/23 0422 02/09/23 0750   BP: (!) 179/81 (!) 164/92 136/79 (!) 141/78   Pulse: 72  63 64   Resp: 18  17 16   Temp: 36.7 °C (98.1 °F)  36.4 °C (97.5 °F) 36.4 °C (97.5 °F)   TempSrc: Temporal  Temporal Temporal   SpO2: 95%  92% 98%   Weight:       Height:         General: Patient in no acute distress, pleasant and cooperative.  HEENT: Normocephalic, no signs of acute trauma.   Neck: supple, no meningeal signs or carotid bruits. There is normal range of motion. No tenderness on exam.   Chest: clear to auscultation. No cough.   CV: RRR, no murmurs.   Skin: no signs of acute rashes or trauma.   Musculoskeletal: joints exhibit full range of motion, without any pain to palpation. There are no signs of joint or muscle swelling. There is no tenderness to deep palpation of muscles.   Psychiatric: No hallucinatory behavior. Denies symptoms of depression or  suicidal ideation. Mood and affect appear normal on exam.     NEUROLOGICAL EXAM:   Mental status, orientation: Awake, alert and fully oriented, Mandarin speaking   Speech and language: speech is clear and fluent. The patient is able to name, repeat and comprehend.   Memory: There is intact recollection of recent and remote events.   Cranial nerve exam: Pupils are 3-4 mm bilaterally and equally reactive to light and accommodation. Visual fields are intact by confrontation. There is no nystagmus on primary or secondary gaze. Intact full EOM in all directions of gaze. Face appears symmetric. Sensation in the face is intact to light touch. Uvula is midline. Tongue is midline. Shoulder shrug is intact bilaterally.   Motor exam: Strength is 5/5 in RUE and RLE. LUE and LLE are 4+/5, left  weaker than right  Sensory exam reveals normal sense of light touch, proprioception, vibration and pinprick in all extremities.   Deep tendon reflexes:  2+ throughout. Plantar responses are flexor. There is no clonus.   Coordination: Mild LUE ataxia appreciated  Gait: Not assessed due to patient preference    NIH Stroke Scale    1a Level of Consciousness 0  1b Orientation Questions 0  1c Response to Commands 0  2 Gaze 0  3 Visual Fields 0  4 Facial Movement 0  5 Motor Function (arm)   a Left 0  b Right 0  6 Motor Function (leg)   a Left 0  b Right 0  7 Limb Ataxia 1  8 Sensory 0  9 Language 0  10 Articulation 0  11 Extinction/Inattention 0    Score: 1    ANCILLARY DATA REVIEWED:     Lab Data Review:  Recent Results (from the past 24 hour(s))   CBC WITH DIFFERENTIAL    Collection Time: 02/08/23  9:10 PM   Result Value Ref Range    WBC 5.5 4.8 - 10.8 K/uL    RBC 3.33 (L) 4.20 - 5.40 M/uL    Hemoglobin 11.3 (L) 12.0 - 16.0 g/dL    Hematocrit 33.3 (L) 37.0 - 47.0 %    .0 (H) 81.4 - 97.8 fL    MCH 33.9 (H) 27.0 - 33.0 pg    MCHC 33.9 33.6 - 35.0 g/dL    RDW 53.6 (H) 35.9 - 50.0 fL    Platelet Count 196 164 - 446 K/uL    MPV 9.2 9.0  - 12.9 fL    Neutrophils-Polys 47.70 44.00 - 72.00 %    Lymphocytes 43.90 (H) 22.00 - 41.00 %    Monocytes 7.30 0.00 - 13.40 %    Eosinophils 0.50 0.00 - 6.90 %    Basophils 0.40 0.00 - 1.80 %    Immature Granulocytes 0.20 0.00 - 0.90 %    Nucleated RBC 0.00 /100 WBC    Neutrophils (Absolute) 2.62 2.00 - 7.15 K/uL    Lymphs (Absolute) 2.41 1.00 - 4.80 K/uL    Monos (Absolute) 0.40 0.00 - 0.85 K/uL    Eos (Absolute) 0.03 0.00 - 0.51 K/uL    Baso (Absolute) 0.02 0.00 - 0.12 K/uL    Immature Granulocytes (abs) 0.01 0.00 - 0.11 K/uL    NRBC (Absolute) 0.00 K/uL   COMP METABOLIC PANEL    Collection Time: 02/08/23  9:10 PM   Result Value Ref Range    Sodium 140 135 - 145 mmol/L    Potassium 4.1 3.6 - 5.5 mmol/L    Chloride 106 96 - 112 mmol/L    Co2 22 20 - 33 mmol/L    Anion Gap 12.0 7.0 - 16.0    Glucose 154 (H) 65 - 99 mg/dL    Bun 22 8 - 22 mg/dL    Creatinine 1.12 0.50 - 1.40 mg/dL    Calcium 9.4 8.5 - 10.5 mg/dL    AST(SGOT) 20 12 - 45 U/L    ALT(SGPT) 10 2 - 50 U/L    Alkaline Phosphatase 98 30 - 99 U/L    Total Bilirubin 0.3 0.1 - 1.5 mg/dL    Albumin 4.6 3.2 - 4.9 g/dL    Total Protein 7.6 6.0 - 8.2 g/dL    Globulin 3.0 1.9 - 3.5 g/dL    A-G Ratio 1.5 g/dL   TROPONIN    Collection Time: 02/08/23  9:10 PM   Result Value Ref Range    Troponin T 11 6 - 19 ng/L   PT/INR    Collection Time: 02/08/23  9:10 PM   Result Value Ref Range    PT 13.0 12.0 - 14.6 sec    INR 0.99 0.87 - 1.13   TSH WITH REFLEX TO FT4    Collection Time: 02/08/23  9:10 PM   Result Value Ref Range    TSH 6.280 (H) 0.380 - 5.330 uIU/mL   CORRECTED CALCIUM    Collection Time: 02/08/23  9:10 PM   Result Value Ref Range    Correct Calcium 8.9 8.5 - 10.5 mg/dL   ESTIMATED GFR    Collection Time: 02/08/23  9:10 PM   Result Value Ref Range    GFR (CKD-EPI) 51 (A) >60 mL/min/1.73 m 2   FREE THYROXINE    Collection Time: 02/08/23  9:10 PM   Result Value Ref Range    Free T-4 0.97 0.93 - 1.70 ng/dL   CBC WITH DIFFERENTIAL    Collection Time: 02/09/23  4:52  AM   Result Value Ref Range    WBC 6.4 4.8 - 10.8 K/uL    RBC 3.45 (L) 4.20 - 5.40 M/uL    Hemoglobin 11.8 (L) 12.0 - 16.0 g/dL    Hematocrit 34.4 (L) 37.0 - 47.0 %    MCV 99.7 (H) 81.4 - 97.8 fL    MCH 34.2 (H) 27.0 - 33.0 pg    MCHC 34.3 33.6 - 35.0 g/dL    RDW 52.9 (H) 35.9 - 50.0 fL    Platelet Count 182 164 - 446 K/uL    MPV 9.3 9.0 - 12.9 fL    Neutrophils-Polys 49.60 44.00 - 72.00 %    Lymphocytes 44.20 (H) 22.00 - 41.00 %    Monocytes 5.40 0.00 - 13.40 %    Eosinophils 0.30 0.00 - 6.90 %    Basophils 0.30 0.00 - 1.80 %    Immature Granulocytes 0.20 0.00 - 0.90 %    Nucleated RBC 0.00 /100 WBC    Neutrophils (Absolute) 3.19 2.00 - 7.15 K/uL    Lymphs (Absolute) 2.84 1.00 - 4.80 K/uL    Monos (Absolute) 0.35 0.00 - 0.85 K/uL    Eos (Absolute) 0.02 0.00 - 0.51 K/uL    Baso (Absolute) 0.02 0.00 - 0.12 K/uL    Immature Granulocytes (abs) 0.01 0.00 - 0.11 K/uL    NRBC (Absolute) 0.00 K/uL   Renal Function Panel    Collection Time: 02/09/23  4:52 AM   Result Value Ref Range    Sodium 139 135 - 145 mmol/L    Potassium 4.8 3.6 - 5.5 mmol/L    Chloride 106 96 - 112 mmol/L    Co2 21 20 - 33 mmol/L    Glucose 114 (H) 65 - 99 mg/dL    Creatinine 0.97 0.50 - 1.40 mg/dL    Bun 19 8 - 22 mg/dL    Calcium 9.4 8.5 - 10.5 mg/dL    Phosphorus 3.5 2.5 - 4.5 mg/dL    Albumin 4.6 3.2 - 4.9 g/dL   MAGNESIUM    Collection Time: 02/09/23  4:52 AM   Result Value Ref Range    Magnesium 2.3 1.5 - 2.5 mg/dL   HEMOGLOBIN A1C    Collection Time: 02/09/23  4:52 AM   Result Value Ref Range    Glycohemoglobin 5.9 (H) 4.0 - 5.6 %    Est Avg Glucose 123 mg/dL   Lipid Profile    Collection Time: 02/09/23  4:52 AM   Result Value Ref Range    Cholesterol,Tot 151 100 - 199 mg/dL    Triglycerides 80 0 - 149 mg/dL    HDL 40 >=40 mg/dL    LDL 95 <100 mg/dL   FREE THYROXINE    Collection Time: 02/09/23  4:52 AM   Result Value Ref Range    Free T-4 1.01 0.93 - 1.70 ng/dL   TSH WITH REFLEX TO FT4    Collection Time: 02/09/23  4:52 AM   Result Value Ref  Range    TSH 6.220 (H) 0.380 - 5.330 uIU/mL   CORRECTED CALCIUM    Collection Time: 23  4:52 AM   Result Value Ref Range    Correct Calcium 8.9 8.5 - 10.5 mg/dL   ESTIMATED GFR    Collection Time: 23  4:52 AM   Result Value Ref Range    GFR (CKD-EPI) 61 >60 mL/min/1.73 m 2   EKG    Collection Time: 23  5:28 AM   Result Value Ref Range    Report       [facility]    Test Date:  2023  Pt Name:    VAMSI BRUCE                  Department: ER  MRN:        [id number]                      Room:       [facility]  Gender:     Female                       Technician: CARTER  :        1945                   Requested By:HAMZAH MOREIRA  Order #:    [id number]                    Reading MD:    Measurements  Intervals                                Axis  Rate:       66                           P:          54  KS:         230                          QRS:        -2  QRSD:       81                           T:          36  QT:         397  QTc:        416    Interpretive Statements  Sinus rhythm  Prolonged KS interval  Nonspecific T abnormalities, lateral leads  Borderline ST elevation, lateral leads  Artifact in lead(s) I,II,III,aVR,aVL,aVF,V1,V2,V3,V4,V5,V6  No previous ECG available for comparison         Labs reviewed by me.       Imaging reviewed by me:     CT-CTA HEAD WITH & W/O-POST PROCESS   Final Result         1.  Focal right M2 occlusion with distal reconstitution.      These findings were discussed with the patient's clinician, Hamzah Moreira, on 2023 12:46 AM.      Note: Interpretation delayed due to technical PACS issues.      CT-CTA NECK WITH & W/O-POST PROCESSING   Final Result         1.  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      These findings were discussed with the patient's clinician, Ladi Dsouza, on 2023 12:25 AM.      Note: Interpretation somewhat delayed due to technical PACS issues.      OUTSIDE IMAGES-MR BRAIN   Final Result      EC-ECHOCARDIOGRAM  COMPLETE W/O CONT    (Results Pending)         Presumed mechanism by TOAST:  __Large Artery Atherosclerosis  X_Small Vessel (Lacunar)  __Cardioembolic  __Other (Sickle Cell, Vasculitis, Hypercoagulable)  __Unknown    Modified Manhattan Scale (MRS): 1 = No significant disability, despite symptoms; able to perform all usual duties and activities      ASSESSMENT AND PLAN:    Assessment and Plan:     77 y.o.  female with a PMHx of hypertension, and hypothyroidism who presented on 2/8/2023 with a 2-week history of left upper and left lower extremity discoordination and weakness.  The patient was seen by her primary care provider who initially completed lab work which was significant for hypothyroidism but symptoms persisted therefore prompting the PCP to order an MRI brain.  MRI brain demonstrated right temporal parietal, and occipital subacute infarcts which prompted the primary care provider to instruct the patient to present to the ED for further evaluation.  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  With last known well being over 2 weeks ago the patient is not a candidate for acute stroke therapies.    Stroke has watershed appearance although it is large for lacunar strokes so cannot rule out embolic etiology as well.  High grade stenosis appreciated in the basilar artery as well as the right M2 segment therefore will start patient on DAPT for intracranial atherosclerotic disease and complete embolic work-up at this time.  It is prudent to avoid hypotension due to the nature of the stenosis, patient currently normotensive.     Problem list:  Right sided subacute stroke      Recommendations:  - Q4h neuro exams  - Normotensive BP goal, 110-130/60-80.  Antihypertensives per primary team.  Avoid hypotension with observed cranial high-grade stenoses  - Start patient on DAPT with ASA 81 mg daily, and Plavix 75 mg daily x90 days.  At the end of 90  days transition patient to monotherapy with aspirin 81 mg daily  - Stroke Labs: LDL 95, A1c 5.9  - Recommend atorvastatin 40 mg nightly, long-term LDL goal less than 70  - Blood glucose management per primary team.  FSBS goal 80 -180  - Obtain transthoracic echocardiogram without bubble study.  Will need long-term cardiac monitoring with Zio patch on discharge  - PT/OT/SLP evaluations and treatment  - DVT prophylaxis: SCDs, okay for Lovenox from neurology perspective    Case reviewed and plan created with Dr. Noble Turner, Acute Neurology. Please call with any questions      GURPREET Barone.  [facility]

## 2023-02-09 NOTE — DISCHARGE PLANNING
Case Management Discharge Planning    Admission Date: 2/8/2023  GMLOS: 1.9  ALOS: 1    6-Clicks ADL Score:    6-Clicks Mobility Score: 18      Anticipated Discharge Dispo: Discharge Disposition: Discharged to home/self care (01)  Discharge Address: [address] [place] [address]    DME Needed: Yes, walker  DME Ordered: Yes    Action(s) Taken: CM met at bedside with patient & daughter, Sima Lindsay.  Patient, AAOx4, and LEP (mostly Mandarin speaking); Daughter providing all information to CM.    Patient resides in a single level house with her daughter.  Prior to admission she was independent with ADLs and ambulating w/o assistive devices.  No DME used & not active with [facility].  Return transportation to home will be provided by daughter/Sima Lindsay.  Patient has no income & is financially supported by her daughter.    PCP:  Dr. Emile Bansal    PT/OT eval pending at time of interview; CM will f/u for DC recommendations.    Escalations Completed: None    Medically Clear: No    Next Steps: CM will f/u on medical clearance & PT/OT recommendations    Barriers to Discharge: Medical clearance    Is the patient up for discharge tomorrow:  Potentially    **1445 Hrs - Per Dr. Garcia, plan is for DC Home with HHC & walker.  CM discussed DCP with daughter who is agreeable to plan & selected following agencies for services:    : 1- [facility], [facility], [facility]    DME:  [facility]    Choice forms faxed to [facility]; Referrals sent via Epic.    **1615 Hrs - Accepted by [facility].    Care Transition Team Assessment    Information Source  Orientation Level: Oriented X4  Information Given By: Relative  Informant's Name: Sima Holder  Who is responsible for making decisions for patient? : Patient    Readmission Evaluation  Is this a readmission?: No    Elopement Risk  Legal Hold: No  Ambulatory or Self Mobile in Wheelchair: No-Not an Elopement Risk  Disoriented: No  Psychiatric Symptoms: None  History of Wandering:  No  Elopement this Admit: No  Vocalizing Wanting to Leave: No  Displays Behaviors, Body Language Wanting to Leave: No-Not at Risk for Elopement  Elopement Risk: Not at Risk for Elopement  Picture of Patient on Inside Chart Front Cover: No (See Comments)  Purple Armband on Patient: No (See Comments)    Interdisciplinary Discharge Planning  Lives with - Patient's Self Care Capacity: Adult Children  Support Systems: Children  Housing / Facility: [address]    Discharge Preparedness  What is your plan after discharge?: Home with help  What are your discharge supports?: Child  Prior Functional Level: Ambulatory, Independent with Activities of Daily Living    Functional Assesment  Prior Functional Level: Ambulatory, Independent with Activities of Daily Living    Finances  Financial Barriers to Discharge: No  Prescription Coverage: Yes    Vision / Hearing Impairment  Vision Impairment : Yes  Right Eye Vision: Impaired, Wears Glasses  Left Eye Vision: Impaired, Wears Glasses  Hearing Impairment : No     Advance Directive  Advance Directive?: None  Advance Directive offered?: AD Booklet refused    Domestic Abuse  Have you ever been the victim of abuse or violence?: No  Physical Abuse or Sexual Abuse: No  Verbal Abuse or Emotional Abuse: No  Possible Abuse/Neglect Reported to:: Not Applicable    Psychological Assessment  History of Substance Abuse: None  History of Psychiatric Problems: No  Non-compliant with Treatment: No  Newly Diagnosed Illness: No    Discharge Risks or Barriers  Discharge risks or barriers?: No    Anticipated Discharge Information  Discharge Disposition: Discharged to home/self care (01)  Discharge Address: [facility] [place] [address]  Discharge Contact Phone Number: [phone]

## 2023-02-09 NOTE — FACE TO FACE
Face to Face Note  -  Durable Medical Equipment    Nesha Garcia D.O. - NPI: [id number]  I certify that this patient is under my care and that they have had a durable medical equipment(DME)face to face encounter by myself that meets the physician DME face-to-face encounter requirements with this patient on:    Date of encounter:   Patient:                    MRN:                       YOB: 2021  Haley Wong  [id number]  1945     The encounter with the patient was in whole, or in part, for the following medical condition, which is the primary reason for durable medical equipment:  CVA    I certify that, based on my findings, the following durable medical equipment is medically necessary:  Walkers.        ------------------------------------------------------------------------------------------------------------------    Face to Face Supporting Documentation - Home Health    The encounter with this patient was in whole or in part the primary reason for home health admission.    Date of encounter:   Patient:                    MRN:                       YOB: 2021  Haley Wong  [id number]  1945     Home health to see patient for:  Skilled Nursing care for assessment, interventions & education, Home health aide, Physical Therapy evaluation and treatment, Occupational therapy evaluation and treatment, and Speech Language Pathology evaluation and treatment    Skilled need for:  New Onset Medical Diagnosis CVA    Skilled nursing interventions to include:  Comment: HH/PT/OT/SLP    Homebound evidenced status by:  Need the aid of supportive devices such as crutches, canes, wheelchairs or walkers. Leaving home must require a considerable and taxing effort. There must exist a normal inability to leave the home.    Community Physician to provide follow up care: Pcp Pt States None     Optional Interventions    Wound information & treatment:    Home Infusion Therapy orders:     Line/Drain/Airway:    I certify the face to face encounter for this home care referral meets the CMS requirements and the encounter/clinical assessment with the patient was, in whole, or in part, for the medical condition(s) listed above, which is the primary reason for home health care. Based on my clinical findings: the service(s) are medically necessary, support the need for home health care, and the homebound criteria are met.  I certify that this patient has had a face to face encounter by myself.  Nesha Garcia D.O. - NPI: [id number]    *Debility, frailty and advanced age in the absence of an acute deterioration or exacerbation of a condition do not qualify a patient for home health.

## 2023-02-10 NOTE — THERAPY
"Physical Therapy   Initial Evaluation     Patient Name: Haley Wong  Age:  77 y.o., Sex:  female  Medical Record #: [id number]  Today's Date: 2/9/2023     Precautions  Precautions: Fall Risk;Swallow Precautions ( See Comments)    Assessment    Patient is 77 y.o. female with admitting diagnosis of CVA with left sided weakness with CTA of neck showing L vertebral artery occlusion. PMHx includes hypothyroidism, hypertension.    Pt is agreeable to participation in therapy session; demonstrates supine to/from sit EOB with supervision, sit to stand from EOB with CGA, ambulation in room and hallway with FWW, CGA and good pacing/safety awareness. Pt does not require further acute PT services at this time, is appropriate for DC home with daughter's support when medically cleared. DC DME recommendation of FWW, recommend HHPT.      Plan    Physical Therapy Initial Treatment Plan   Duration: Evaluation only    DC Equipment Recommendations: Front-Wheel Walker  Discharge Recommendations: Recommend home health for continued physical therapy services       Subjective    \"Caution!\"     Objective       02/09/23 1401   Charge Group   PT Evaluation PT Evaluation Mod   Total Time Spent   PT Total Time Yes   PT Evaluation Time Spent (Mins) 22   PT Total Time Spent (Calculated) 22    Services   Is patient using  services for this encounter? Yes   Language Interpreted Mandarin   Other  Mode daughter  (offered language line, daughter prefers to interpret due to poor internet service with language line earlier in day)   Initial Contact Note    Initial Contact Note Order Received and Verified, Evaluation Only - Patient Does Not Require Further Acute Physical Therapy at this Time.  However, May Benefit from Post Acute Therapy for Higher Level Functional Deficits.   Precautions   Precautions Fall Risk;Swallow Precautions ( See Comments)   Vitals   O2 Delivery Device None - Room Air   Pain 0 - 10 Group   Therapist " Pain Assessment Post Activity Pain Same as Prior to Activity   Prior Living Situation   Prior Services Home-Independent   Housing / Facility 1 Story House   Bathroom Set up Walk In Shower   Equipment Owned None   Lives with - Patient's Self Care Capacity Adult Children   Comments daughter lives with pt, usually leaves for work but is temporarily home with pt   Prior Level of Functional Mobility   Bed Mobility Independent   Transfer Status Independent   Ambulation Independent   Cognition    Cognition / Consciousness WDL   Level of Consciousness Alert   Active ROM Upper Body   Active ROM Upper Body  WDL   Strength Upper Body   Upper Body Strength  WDL   Strength Lower Body   Lower Body Strength  WDL   Coordination Upper Body   Coordination X   Fine Motor Coordination L UE impaired mildly   Coordination Lower Body    Coordination Lower Body  WDL   Vision   Vision Comments L visual field mild deficits   Balance Assessment   Sitting Balance (Static) Good   Sitting Balance (Dynamic) Fair +   Standing Balance (Static) Fair   Standing Balance (Dynamic) Fair   Weight Shift Sitting Fair   Weight Shift Standing Fair   Comments with FWW   Bed Mobility    Supine to Sit Supervised   Sit to Supine Supervised   Scooting Supervised   Gait Analysis   Gait Level Of Assist Standby Assist   Assistive Device Front Wheel Walker   Distance (Feet) 180   # of Times Distance was Traveled 1   Functional Mobility   Sit to Stand Supervised   Bed, Chair, Wheelchair Transfer Supervised   Transfer Method Stand Step   Mobility supine to/from sit, ambulation in hallway with FWW   How much difficulty does the patient currently have...   Turning over in bed (including adjusting bedclothes, sheets and blankets)? 3   Sitting down on and standing up from a chair with arms (e.g., wheelchair, bedside commode, etc.) 3   Moving from lying on back to sitting on the side of the bed? 4   How much help from another person does the patient currently need...    Moving to and from a bed to a chair (including a wheelchair)? 3   Need to walk in a hospital room? 3   Climbing 3-5 steps with a railing? 3   6 clicks Mobility Score 19   Activity Tolerance   Sitting in Chair NT   Sitting Edge of Bed 10 min   Standing 6 min   Education Group   Education Provided Role of Physical Therapist;Gait Training;Transfer Status;Use of Assistive Device   Role of Physical Therapist Patient Response Patient;Family;Acceptance;Explanation;Verbal Demonstration   Gait Training Patient Response Patient;Family;Acceptance;Explanation;Verbal Demonstration;Action Demonstration   Use of Assistive Device Patient Response Patient;Family;Acceptance;Explanation;Verbal Demonstration;Action Demonstration   Transfer Status Patient Response Patient;Family;Acceptance;Explanation;Verbal Demonstration;Action Demonstration   Physical Therapy Initial Treatment Plan    Duration Evaluation only   Problem List    Problems Decreased Activity Tolerance;Impaired Coordination;Impaired Ambulation   Anticipated Discharge Equipment and Recommendations   DC Equipment Recommendations Front-Wheel Walker   Discharge Recommendations Recommend home health for continued physical therapy services   Interdisciplinary Plan of Care Collaboration   IDT Collaboration with  Nursing;Occupational Therapist;Family / Caregiver   Patient Position at End of Therapy In Bed;Bed Alarm On;Call Light within Reach;Tray Table within Reach;Phone within Reach;Family / Friend in Room   Collaboration Comments RN updated   Session Information   Date / Session Number  2/9; eval only     Briseyda Nunez DPT

## 2023-02-14 ENCOUNTER — TELEPHONE (OUTPATIENT)
Dept: HEALTH INFORMATION MANAGEMENT | Facility: OTHER | Age: 78
End: 2023-02-14
Payer: MEDICARE

## 2023-02-27 ENCOUNTER — TELEPHONE (OUTPATIENT)
Dept: CARDIOLOGY | Facility: MEDICAL CENTER | Age: 78
End: 2023-02-27

## 2023-02-27 DIAGNOSIS — I63.9 CEREBROVASCULAR ACCIDENT (CVA), UNSPECIFIED MECHANISM (HCC): ICD-10-CM

## 2023-02-28 ENCOUNTER — OFFICE VISIT (OUTPATIENT)
Dept: NEUROLOGY | Facility: MEDICAL CENTER | Age: 78
End: 2023-02-28
Attending: PSYCHIATRY & NEUROLOGY
Payer: MEDICARE

## 2023-02-28 ENCOUNTER — NON-PROVIDER VISIT (OUTPATIENT)
Dept: CARDIOLOGY | Facility: MEDICAL CENTER | Age: 78
End: 2023-02-28
Payer: MEDICARE

## 2023-02-28 VITALS
RESPIRATION RATE: 14 BRPM | BODY MASS INDEX: 21.89 KG/M2 | OXYGEN SATURATION: 97 % | HEART RATE: 80 BPM | DIASTOLIC BLOOD PRESSURE: 80 MMHG | WEIGHT: 131.39 LBS | SYSTOLIC BLOOD PRESSURE: 150 MMHG | HEIGHT: 65 IN

## 2023-02-28 DIAGNOSIS — I49.1 PREMATURE ATRIAL CONTRACTIONS: ICD-10-CM

## 2023-02-28 DIAGNOSIS — I49.3 PVCS (PREMATURE VENTRICULAR CONTRACTIONS): ICD-10-CM

## 2023-02-28 DIAGNOSIS — Z86.73 HX OF ISCHEMIC RIGHT MCA STROKE: Primary | ICD-10-CM

## 2023-02-28 PROCEDURE — 99215 OFFICE O/P EST HI 40 MIN: CPT | Performed by: PSYCHIATRY & NEUROLOGY

## 2023-02-28 PROCEDURE — 99212 OFFICE O/P EST SF 10 MIN: CPT | Performed by: PSYCHIATRY & NEUROLOGY

## 2023-02-28 PROCEDURE — 93248 EXT ECG>7D<15D REV&INTERPJ: CPT | Performed by: INTERNAL MEDICINE

## 2023-02-28 ASSESSMENT — FIBROSIS 4 INDEX: FIB4 SCORE: 2.61

## 2023-02-28 NOTE — PROGRESS NOTES
Chief Complaint   Patient presents with    Possible Stroke       History of present illness:  Haley Wong 77 y.o. female presents to stroke bridge clinic. This patient presented with 2-week history of left upper and lower extremity weakness, with outpatient brain MRI demonstrating right temporal, parietal and occipital subacute infarcts.  CT angiogram demonstrated a high-grade right M2 stenosis.  Patient was seen by neurology in the hospital, and stroke etiology was attributed to intracranial atherosclerosis, therefore dual antiplatelets were recommended for 90 days.  Her left sided strength is improving but she continues to have weakness of the left arm and trouble walking.   She is currently on aspirin 81mg and plavix 75mg as well as atorvastatin 40mg daily.     Past medical history:   Past Medical History:   Diagnosis Date    Hypertension        Past surgical history:   No past surgical history on file.    Family history:   No family history on file.    Social history:   Social History     Socioeconomic History    Marital status: Single     Spouse name: Not on file    Number of children: Not on file    Years of education: Not on file    Highest education level: Not on file   Occupational History    Not on file   Tobacco Use    Smoking status: Never    Smokeless tobacco: Never   Vaping Use    Vaping Use: Never used   Substance and Sexual Activity    Alcohol use: Never    Drug use: Never    Sexual activity: Not on file   Other Topics Concern    Not on file   Social History Narrative    Not on file     Social Determinants of Health     Financial Resource Strain: Not on file   Food Insecurity: Not on file   Transportation Needs: Not on file   Physical Activity: Not on file   Stress: Not on file   Social Connections: Not on file   Intimate Partner Violence: Not on file   Housing Stability: Not on file       Current medications:   Current Outpatient Medications   Medication    aspirin 81 MG EC tablet    atorvastatin  (LIPITOR) 40 MG Tab    clopidogrel (PLAVIX) 75 MG Tab    lisinopril (PRINIVIL) 20 MG Tab    metoprolol SR (TOPROL XL) 50 MG TABLET SR 24 HR    methimazole (TAPAZOLE) 5 MG Tab     No current facility-administered medications for this visit.       Medication Allergy:  No Known Allergies    Physical examination:     Current NIHSS    1a. LOC: 0  1b. LOC Questions: 0  1c. LOC Commands: 0  2. Best Gaze:0  3. Visual Fields: 0  4. Facial Paresis: 0  5a. Motor arm left: 1  5b. Motor arm right: 0  6a. Motor leg left: 1  6b. Motor leg right: 0  7. Sensory: 0  8. Best Language: 0  9. Limb Ataxia: 0  10. Dysarthria: 0  11. Extinction/Inattention: 0    Total Score: 2        Current mRS 2    Labs:  I reviewed the following labs personally:  Lab Results   Component Value Date/Time    HBA1C 5.9 (H) 02/09/2023 04:52 AM      Lab Results   Component Value Date/Time    CHOLSTRLTOT 151 02/09/2023 0452    TRIGLYCERIDE 80 02/09/2023 0452    HDL 40 02/09/2023 0452    LDL 95 02/09/2023 0452       Imaging:   CTA head   FINDINGS:     The posterior circulation shows the distal vertebral arteries to be patent. The vertebrobasilar confluence is intact. The basilar artery is patent. No aneurysm or occlusive lesion is evident. Persistent fetal morphology of the left posterior cerebral   artery is seen.     Focal short segment right proximal M2 occlusion is seen with distal reconstitution. No aneurysm is evident about the [place].     The brain is unremarkable.     3D angiographic/MIP images of the vasculature confirm the vascular findings as described above.     IMPRESSION:        1.  Focal right M2 occlusion with distal reconstitution.    CTA neck   IMPRESSION:        1.  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    Echocardiography Laboratory     CONCLUSIONS  No prior study is available for comparison.   Normal left ventricular systolic function.  The left ventricular ejection fraction is visually estimated to be  65%.  Normal right ventricular size and systolic function.  No significant valvular abnormalities.     Findings:   Analysis duration: 1 day and 14hrs   Underlying rhythm: Predominantly sinus rhythm   The average heart rate is 78 BPM, and ranges from  BPM   Atrial events: <1%  rare PACs   1 run of SVT occurred lasting 6 beats at a maximum rate of 143bpm: Underlying artifact limits accurate interpretation   Ventricular events: <1%  rare PVCs   Pauses or high degree heart block: None   Patient events: No patient triggers     2 day holter monitor:   Conclusion:   Analysis time was only 1 day and 14 hours   Rare PACs and PVCs   No patient triggers   1 run of SVT occurred lasting 6 beats at a maximum rate of 143bpm: Underlying artifact limits accurate interpretation, probably Atrial tachycardia     EKG  Interpretive Statements   Sinus rhythm   Prolonged IL interval   Nonspecific T abnormalities, lateral leads   Borderline ST elevation, lateral leads   No previous ECG available for comparison     ASSESSMENT AND PLAN:  Problem List Items Addressed This Visit    None  Visit Diagnoses       Hx of ischemic right MCA stroke    -  Primary    Relevant Orders    CT-CTA HEAD WITH & W/O-POST PROCESS    Cardiac Event Monitor    REFERRAL TO CARDIOLOGY            1. Hx of ischemic right MCA stroke  - CT-CTA HEAD WITH & W/O-POST PROCESS; Future  - Cardiac Event Monitor; Future  - REFERRAL TO CARDIOLOGY      Presumed Mechanism by TOAST  __  Large-artery atherosclerosis  __  Cardioembolism  __  Small-vessel occlusion  __  Stroke of other determined etiology   _x_  Stroke of undetermined etiology     Antithrombotic: continue both aspirin 81mg and plavix 75mg daily until next f/u visit   Blood pressure goal: <140/90   LDL goal: continue atorvastatin 40mg     Based on the intracranial imaging of an occluded right M2, I am uncertain if this was an embolic or atherosclerotic stroke.  I have spoken with interventional radiologist   Alda who recommends repeat CTA head 1 month post-stroke to determine if this is embolic occlusion or intracranial stenosis.  The occlusion would be expected to improve if this was an embolic etiology.  In the meantime, I will order 28-days of cardiac monitoring to evaluate for atrial fibrillation.  She attempted this initially at discharge from the hospital, was unable to tolerate the Ziopatch.  I counseled her daughter on the necessity to repeat this.  She will remain on dual antiplatelet therapy until the next follow-up visit given the possibility of intracranial atherosclerosis as the stroke etiology.    FOLLOW-UP:   Return in about 2 months (around 4/28/2023).    Total time spent for the day for this patient unrelated to procedure time is: 53 minutes. I spent 34 minutes in face to face time and I spent 15 minutes pre-charting and 4 minutes in post-visit documentation.      DALTON LarsonO.  [facility]

## 2023-02-28 NOTE — TELEPHONE ENCOUNTER
EOS noted in media dated yesterday 2/27/23.     AL (ADD): Please see EOS in media dated yesterday and read for final results. Thank you!

## 2023-03-01 NOTE — PATIENT INSTRUCTIONS
There are two studies that need to be completed to determine why the stroke occurred:     CT angiogram scan of the arteries of the head - to be repeated in early March     Cardiac monitor - at least 14-days of cardiac monitoring, 28-days if possible.     Continue aspirin and plavix until the next visit

## 2023-03-14 ENCOUNTER — TELEPHONE (OUTPATIENT)
Dept: SCHEDULING | Facility: IMAGING CENTER | Age: 78
End: 2023-03-14

## 2023-03-14 NOTE — TELEPHONE ENCOUNTER
CW     Good morning, patient is calling in because she was advised during her 2/28 appt with Dr. Mcginnis that she will wear a monitor for 14 days on 3/15/2023 she is to remove the monitor and place a new one on for another 14 days.  Patient states that she was told the other monitor would come through the mail.  She is concerned she has not received it yet and would like to touch base to ensure she understood the directions correctly and to confirm the monitor was mailed out.      Please contact sister Sima @   # [phone]    Thank You,   Tawana ERAZO

## 2023-03-15 ENCOUNTER — HOSPITAL ENCOUNTER (OUTPATIENT)
Dept: RADIOLOGY | Facility: MEDICAL CENTER | Age: 78
End: 2023-03-15
Attending: PSYCHIATRY & NEUROLOGY
Payer: MEDICARE

## 2023-03-15 DIAGNOSIS — Z86.73 HX OF ISCHEMIC RIGHT MCA STROKE: ICD-10-CM

## 2023-03-15 PROCEDURE — 700117 HCHG RX CONTRAST REV CODE 255: Performed by: PSYCHIATRY & NEUROLOGY

## 2023-03-15 PROCEDURE — 70496 CT ANGIOGRAPHY HEAD: CPT

## 2023-03-15 RX ADMIN — IOHEXOL 100 ML: 350 INJECTION, SOLUTION INTRAVENOUS at 13:49

## 2023-03-17 ENCOUNTER — TELEPHONE (OUTPATIENT)
Dept: CARDIOLOGY | Facility: MEDICAL CENTER | Age: 78
End: 2023-03-17
Payer: MEDICARE

## 2023-03-17 DIAGNOSIS — Z86.73 HX OF ISCHEMIC RIGHT MCA STROKE: ICD-10-CM

## 2023-03-23 PROCEDURE — 93248 EXT ECG>7D<15D REV&INTERPJ: CPT | Performed by: INTERNAL MEDICINE

## 2023-03-24 NOTE — PROGRESS NOTES
Cardiology Initial Consultation Note    Date of note: 3/23/2023    Primary Care Provider: Pcp Pt States None  Referring Provider: Nesha Garcia D.O.    Patient Name: Haley Wong  YOB: 1945  MRN:              [id number]    Chief Complaint   Patient presents with    Trauma     F/V Dx: CVA     History of Present Illness: Ms. Haley Wong is a 77 y.o. female whose current medical problems include hyperthyroidism on methimazole, hypertension, and history of cerebrovascular accident who is here for cardiac consultation for any acute etiology of stroke.  The patient is Mandarin speaking, but accompanied by her daughter who helps with translation.  I was also able to speak with her in Mandarin.    Patient presented initially with 2-week history of left upper and lower extremity weakness.  Outpatient brain MRI demonstrated right temporal, parietal and occipital subacute infarcts.  CT angiogram demonstrated high-grade right M2 stenosis.  Patient was seen by neurology in the hospital and recommended dual antiplatelet therapy.  Currently on aspirin and Plavix.  Also placed on atorvastatin.    The patient reports to being diagnosed with hyperthyroidism, she would feel like her heart was racing and palpitating.  They did not do any type of monitor at that time.  However after she got on treatment with methimazole, and metoprolol succinate 50 mg p.o. daily, he calmed down significantly.    Cardiovascular Risk Factors:  1. Smoking status:   Tobacco Use: Low Risk     Smoking Tobacco Use: Never    Smokeless Tobacco Use: Never    Passive Exposure: Not on file     2. Type II Diabetes Mellitus:   Lab Results   Component Value Date/Time    HBA1C 5.9 (H) 02/09/2023 04:52 AM     3. Hypertension: Yes on lisinopril 20 mg p.o. daily, metoprolol succinate 50 mg p.o. daily  4. Dyslipidemia: Not on statins, she was started on a atorvastatin 40 mg p.o. daily after she presented with a stroke  Cholesterol,Tot   Date Value Ref  "Range Status   02/09/2023 151 100 - 199 mg/dL Final     LDL   Date Value Ref Range Status   02/09/2023 95 <100 mg/dL Final     HDL   Date Value Ref Range Status   02/09/2023 40 >=40 mg/dL Final     Triglycerides   Date Value Ref Range Status   02/09/2023 80 0 - 149 mg/dL Final     5. Family history of early Coronary Artery Disease in a first degree relative (Male less than 55 years of age; Female less than 65 years of age): No    Past Medical History:   Diagnosis Date    Hypertension        History reviewed. No pertinent surgical history.    Current Outpatient Medications   Medication Sig Dispense Refill    atorvastatin (LIPITOR) 40 MG Tab       Aspirin 81 MG Cap       clopidogrel (PLAVIX) 75 MG Tab Take 1 Tablet by mouth every day for 90 days. 90 Tablet 0    lisinopril (PRINIVIL) 20 MG Tab Take 20 mg by mouth every day.      metoprolol SR (TOPROL XL) 50 MG TABLET SR 24 HR Take 50 mg by mouth every day.      methimazole (TAPAZOLE) 5 MG Tab Take 5 mg by mouth 2 times a day.       No current facility-administered medications for this visit.       No Known Allergies    History reviewed. No pertinent family history.    Social History     Socioeconomic History    Marital status: Single   Tobacco Use    Smoking status: Never    Smokeless tobacco: Never   Vaping Use    Vaping Use: Never used   Substance and Sexual Activity    Alcohol use: Never    Drug use: Never        Review of Systems   Constitutional: Negative for diaphoresis and fever.   Respiratory:  Negative for cough, hemoptysis and wheezing.    Gastrointestinal:  Negative for hematochezia and melena.   Genitourinary:  Negative for hematuria.     Physical Exam:  Ambulatory Vitals  BP (!) 146/82 (BP Location: Left arm, Patient Position: Sitting, BP Cuff Size: Adult)   Pulse 90   Resp 14   Ht 1.651 m (5' 5\")   Wt 57.2 kg (126 lb)   SpO2 97%    Oxygen Therapy:  Pulse Oximetry: 97 %  BP Readings from Last 4 Encounters:   03/27/23 (!) 146/82   02/28/23 (!) 150/80 "   02/09/23 (!) 152/84       Weight/BMI:   Body mass index is 20.97 kg/m².  Wt Readings from Last 2 Encounters:   03/27/23 57.2 kg (126 lb)   02/28/23 59.6 kg (131 lb 6.3 oz)       Physical Exam  Constitutional:       Appearance: Normal appearance.   Eyes:      Extraocular Movements: Extraocular movements intact.      Conjunctiva/sclera: Conjunctivae normal.   Neck:      Vascular: No carotid bruit or JVD.   Cardiovascular:      Rate and Rhythm: Normal rate and regular rhythm.      Pulses:           Radial pulses are 2+ on the right side and 2+ on the left side.        Posterior tibial pulses are 1+ on the right side and 1+ on the left side.      Heart sounds: Normal heart sounds. No murmur heard.    No friction rub. No gallop.   Pulmonary:      Effort: Pulmonary effort is normal. No respiratory distress.      Breath sounds: Normal breath sounds. No wheezing.   Musculoskeletal:      Cervical back: Neck supple.      Right lower leg: No edema.      Left lower leg: No edema.   Skin:     General: Skin is warm and dry.   Neurological:      Mental Status: She is alert and oriented to person, place, and time. Mental status is at baseline.   Psychiatric:         Mood and Affect: Mood normal.        Lab Data Review:  Lab Results   Component Value Date/Time    SODIUM 139 02/09/2023 04:52 AM    POTASSIUM 4.8 02/09/2023 04:52 AM    CHLORIDE 106 02/09/2023 04:52 AM    CO2 21 02/09/2023 04:52 AM    GLUCOSE 114 (H) 02/09/2023 04:52 AM    BUN 19 02/09/2023 04:52 AM    CREATININE 0.97 02/09/2023 04:52 AM     Lab Results   Component Value Date/Time    ALKPHOSPHAT 98 02/08/2023 09:10 PM    ASTSGOT 20 02/08/2023 09:10 PM    ALTSGPT 10 02/08/2023 09:10 PM    TBILIRUBIN 0.3 02/08/2023 09:10 PM      Lab Results   Component Value Date/Time    WBC 6.4 02/09/2023 04:52 AM     Lab Results   Component Value Date/Time    TSHULTRASEN 6.220 (H) 02/09/2023 04:52 AM      Cardiac Imaging and Procedures Review:    EKG dated 2/9/23: My personal  interpretation is sinus rhythm, 66 bpm, no prior ECG for comparison    Echo dated 2/9/23: My personal interpretation is normal left ventricular cavity size, wall thickness, and systolic function.  Visually estimated LVEF of 60 to 65%.  Normal right ventricular systolic function.  No significant valvular stenosis or regurgitation.  Normal aortic root size.  RV systolic pressure estimated at 25 mmHg plus right atrial pressure.    Patient had a ZIO AT on 2/28/23 but it was only for 1 day and 14 hours with no atrial fibrillation captured.  We attempted the Zio patch 3/17/23 with no patient triggered events, rare premature atrial and ventricular beats.  No high-grade blocks atrial fibrillation captured (total of 12 days and 21 hours)    Assessment & Plan     1.  History of embolic stroke.  CT showed a focal right M2 occlusion with distal reconstitution.  It was referred to cardiology to rule out cardiac source.  Echo did not suggest any cardiac source.  Discussed with patient and daughter other cardiac etiology would be atrial fibrillation.  She did have 2 Zio patches done.  The first 1 only was for 1 day because it fell off.  Second Zio patch was for 12 hours.  I discussed with the daughter and patient that we certainly could reorder another one.  However, even if we do not capture atrial fibrillation does not necessarily mean we have ruled it out.  I discussed with them about doing a loop recorder which can be in there for 2 years to monitor for occult A-fib.  Patient was a little bit reluctant.  The other option I discussed with him was to purchase [employer] and check her rhythm 2-3 times a day to make sure she does not have any atrial fibrillation that she does not feel.  She is also can check her rhythm when she does feel anything.  If [employer] does mention she has some type of arrhythmia or atrial fibrillation she should then schedule an appointment back to see us.  Patient would be agreeable to do that.  I  am going to defer the dual antiplatelet therapy for stroke to the neurologist.    Shared Medical Decision Making:  All of patient's questions were answered to the best of my knowledge and to patient's satisfaction. It was a pleasure seeing Ms. Haley Wong in my clinic today. Return if symptoms worsen or fail to improve. Patient is aware to call the cardiology clinic with any questions or concerns.    Lela Wong MD  [facility]  [address]  [place] [address]  Phone:  [phone]  Fax:  [phone]    Please note that this dictation was created using voice recognition software. I have made every reasonable attempt to correct obvious errors, but it is possible there are errors of grammar and possibly content that I did not discover before finalizing the note.

## 2023-03-27 ENCOUNTER — OFFICE VISIT (OUTPATIENT)
Dept: CARDIOLOGY | Facility: MEDICAL CENTER | Age: 78
End: 2023-03-27
Attending: GENERAL PRACTICE
Payer: MEDICARE

## 2023-03-27 VITALS
OXYGEN SATURATION: 97 % | DIASTOLIC BLOOD PRESSURE: 82 MMHG | HEIGHT: 65 IN | RESPIRATION RATE: 14 BRPM | HEART RATE: 90 BPM | BODY MASS INDEX: 20.99 KG/M2 | WEIGHT: 126 LBS | SYSTOLIC BLOOD PRESSURE: 146 MMHG

## 2023-03-27 DIAGNOSIS — I63.9 CEREBROVASCULAR ACCIDENT (CVA), UNSPECIFIED MECHANISM (HCC): ICD-10-CM

## 2023-03-27 PROCEDURE — 99204 OFFICE O/P NEW MOD 45 MIN: CPT | Performed by: INTERNAL MEDICINE

## 2023-03-27 RX ORDER — ATORVASTATIN CALCIUM 40 MG/1
40 TABLET, FILM COATED ORAL DAILY
COMMUNITY
Start: 2023-02-09

## 2023-03-27 ASSESSMENT — ENCOUNTER SYMPTOMS
FEVER: 0
DIAPHORESIS: 0
WHEEZING: 0
HEMATOCHEZIA: 0
HEMOPTYSIS: 0
COUGH: 0

## 2023-03-27 ASSESSMENT — FIBROSIS 4 INDEX: FIB4 SCORE: 2.61

## 2023-04-19 ENCOUNTER — OFFICE VISIT (OUTPATIENT)
Dept: NEUROLOGY | Facility: MEDICAL CENTER | Age: 78
End: 2023-04-19
Attending: PSYCHIATRY & NEUROLOGY
Payer: MEDICARE

## 2023-04-19 VITALS
OXYGEN SATURATION: 94 % | HEART RATE: 85 BPM | BODY MASS INDEX: 21.52 KG/M2 | SYSTOLIC BLOOD PRESSURE: 140 MMHG | DIASTOLIC BLOOD PRESSURE: 76 MMHG | HEIGHT: 65 IN | RESPIRATION RATE: 14 BRPM | WEIGHT: 129.19 LBS

## 2023-04-19 DIAGNOSIS — Z86.73 HX OF ISCHEMIC RIGHT MCA STROKE: ICD-10-CM

## 2023-04-19 DIAGNOSIS — E78.9 LIPID DISORDER: ICD-10-CM

## 2023-04-19 PROCEDURE — 99214 OFFICE O/P EST MOD 30 MIN: CPT | Performed by: PSYCHIATRY & NEUROLOGY

## 2023-04-19 PROCEDURE — 99212 OFFICE O/P EST SF 10 MIN: CPT | Performed by: PSYCHIATRY & NEUROLOGY

## 2023-04-19 ASSESSMENT — FIBROSIS 4 INDEX: FIB4 SCORE: 2.61

## 2023-04-19 NOTE — PROGRESS NOTES
Chief Complaint   Patient presents with    Possible Stroke    Injections       History of present illness:  Haley Wong 77 y.o. female with ischemic stroke secondary to high grade right M2 stenosis.   She has been on DAPT for intracranial atherosclerosis.       Past medical history:   Past Medical History:   Diagnosis Date    Hypertension        Past surgical history:   No past surgical history on file.    Family history:   No family history on file.    Social history:   Social History     Socioeconomic History    Marital status: Single     Spouse name: Not on file    Number of children: Not on file    Years of education: Not on file    Highest education level: Not on file   Occupational History    Not on file   Tobacco Use    Smoking status: Never    Smokeless tobacco: Never   Vaping Use    Vaping Use: Never used   Substance and Sexual Activity    Alcohol use: Never    Drug use: Never    Sexual activity: Not on file   Other Topics Concern    Not on file   Social History Narrative    Not on file     Social Determinants of Health     Financial Resource Strain: Not on file   Food Insecurity: Not on file   Transportation Needs: Not on file   Physical Activity: Not on file   Stress: Not on file   Social Connections: Not on file   Intimate Partner Violence: Not on file   Housing Stability: Not on file       Current medications:   Current Outpatient Medications   Medication    atorvastatin (LIPITOR) 40 MG Tab    Aspirin 81 MG Cap    clopidogrel (PLAVIX) 75 MG Tab    lisinopril (PRINIVIL) 20 MG Tab    metoprolol SR (TOPROL XL) 50 MG TABLET SR 24 HR    methimazole (TAPAZOLE) 5 MG Tab     No current facility-administered medications for this visit.       Medication Allergy:  No Known Allergies    Physical examination:   Vitals:    04/19/23 1141   BP: (!) 140/76   BP Location: Right arm   Patient Position: Sitting   BP Cuff Size: Adult   Pulse: 85   Resp: 14   SpO2: 94%   Weight: 58.6 kg (129 lb 3 oz)   Height: 1.651 m (5'  "5\")       Labs:  I reviewed the following labs personally:  LDL: 95     Imaging:   CTA head  IMPRESSION:     1.  Focal moderate to high-grade stenosis in the RIGHT middle cerebral artery at the proximal M2 segment, without occlusion.  Appearance is similar to prior.  2.  No intracranial aneurysm or abrupt large vessel cut off.  3.  Diffuse atrophy with white matter changes.  4.  RIGHT posterior frontal and parietal encephalomalacia.    CTA head   FINDINGS:     The posterior circulation shows the distal vertebral arteries to be patent. The vertebrobasilar confluence is intact. The basilar artery is patent. No aneurysm or occlusive lesion is evident. Persistent fetal morphology of the left posterior cerebral   artery is seen.     Focal short segment right proximal M2 occlusion is seen with distal reconstitution. No aneurysm is evident about the [place].     The brain is unremarkable.     3D angiographic/MIP images of the vasculature confirm the vascular findings as described above.     IMPRESSION:        1.  Focal right M2 occlusion with distal reconstitution.     CTA neck   IMPRESSION:        1.  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     Echocardiography Laboratory     CONCLUSIONS  No prior study is available for comparison.   Normal left ventricular systolic function.  The left ventricular ejection fraction is visually estimated to be 65%.  Normal right ventricular size and systolic function.  No significant valvular abnormalities.      Findings:   Analysis duration: 1 day and 14hrs   Underlying rhythm: Predominantly sinus rhythm   The average heart rate is 78 BPM, and ranges from  BPM   Atrial events: <1%  rare PACs   1 run of SVT occurred lasting 6 beats at a maximum rate of 143bpm: Underlying artifact limits accurate interpretation   Ventricular events: <1%  rare PVCs   Pauses or high degree heart block: None   Patient events: No patient triggers      2 day holter monitor: "   Conclusion:   Analysis time was only 1 day and 14 hours   Rare PACs and PVCs   No patient triggers   1 run of SVT occurred lasting 6 beats at a maximum rate of 143bpm: Underlying artifact limits accurate interpretation, probably Atrial tachycardia      EKG  Interpretive Statements   Sinus rhythm   Prolonged [place] interval   Nonspecific T abnormalities, lateral leads   Borderline ST elevation, lateral leads   No previous ECG available for comparison     ASSESSMENT AND PLAN:  Problem List Items Addressed This Visit    None  Visit Diagnoses       Hx of ischemic right MCA stroke        Relevant Orders    Lipid Profile    Lipid disorder        Relevant Orders    Lipid Profile            1. Hx of ischemic right MCA stroke  - Lipid Profile; Future    2. Lipid disorder  - Lipid Profile; Future    Patient's stroke etiology is intracranial atherosclerosis due to right middle cerebral artery stenosis.  I have counseled her to remain on dual antiplatelet therapy for a total of 90 days. On May 8, she will transition to aspirin alone.  Her goal LDL cholesterol is less than 70.  I have ordered this laboratory test.  She will follow-up with her primary doctor to ensure that it is at goal.  If not, her atorvastatin dose should be increased to 80 mg.  I have counseled the daughter on the medical management being the first-line therapy.  Intracranial stenting is not advised unless there are recurrent vascular events.    FOLLOW-UP:   Return if symptoms worsen or fail to improve.    Total time spent for the day for this patient unrelated to procedure time is: 24 minutes. I spent 19 minutes in face to face time and I spent 2 minutes pre-charting and 3 minutes in post-visit documentation.      Dr. Fernie Mcginnis D.O.  [facility]

## 2023-04-19 NOTE — PATIENT INSTRUCTIONS
Stop plavix after 90 days of total treatment and continue aspirin 81mg daily after this   Go to the lab to have your blood drawn for a repeat lipid panel   Continue atorvastatin for a goal LDL cholesterol < 70

## 2023-11-29 ENCOUNTER — PATIENT MESSAGE (OUTPATIENT)
Dept: HEALTH INFORMATION MANAGEMENT | Facility: OTHER | Age: 78
End: 2023-11-29

## 2025-04-03 ENCOUNTER — HOSPITAL ENCOUNTER (OUTPATIENT)
Dept: RADIOLOGY | Facility: MEDICAL CENTER | Age: 80
End: 2025-04-03
Attending: FAMILY MEDICINE
Payer: MEDICARE

## 2025-04-03 DIAGNOSIS — I69.30 PERSONAL HISTORY OF STROKE WITH RESIDUAL EFFECTS: ICD-10-CM
